# Patient Record
Sex: FEMALE | Race: WHITE | NOT HISPANIC OR LATINO | Employment: OTHER
[De-identification: names, ages, dates, MRNs, and addresses within clinical notes are randomized per-mention and may not be internally consistent; named-entity substitution may affect disease eponyms.]

---

## 2020-06-16 ENCOUNTER — RECORDS - HEALTHEAST (OUTPATIENT)
Dept: ADMINISTRATIVE | Facility: OTHER | Age: 85
End: 2020-06-16

## 2020-06-16 ENCOUNTER — AMBULATORY - HEALTHEAST (OUTPATIENT)
Dept: CARDIOLOGY | Facility: CLINIC | Age: 85
End: 2020-06-16

## 2020-06-19 ENCOUNTER — OFFICE VISIT - HEALTHEAST (OUTPATIENT)
Dept: CARDIOLOGY | Facility: CLINIC | Age: 85
End: 2020-06-19

## 2020-06-19 DIAGNOSIS — I10 BENIGN ESSENTIAL HYPERTENSION: ICD-10-CM

## 2020-06-19 DIAGNOSIS — I51.7 CARDIOMEGALY: ICD-10-CM

## 2020-06-19 DIAGNOSIS — R60.0 LOWER EXTREMITY EDEMA: ICD-10-CM

## 2020-06-19 RX ORDER — ERGOCALCIFEROL (VITAMIN D2) 10 MCG
1 TABLET ORAL DAILY
Status: SHIPPED | COMMUNITY
Start: 2020-06-19 | End: 2024-03-06

## 2020-06-19 RX ORDER — AMPICILLIN TRIHYDRATE 250 MG
500 CAPSULE ORAL DAILY
Status: SHIPPED | COMMUNITY
Start: 2020-06-19 | End: 2024-03-06

## 2020-06-19 RX ORDER — VITAMIN E 268 MG
1 CAPSULE ORAL DAILY
Status: SHIPPED | COMMUNITY
Start: 2020-06-19 | End: 2024-03-06

## 2020-06-26 ENCOUNTER — COMMUNICATION - HEALTHEAST (OUTPATIENT)
Dept: CARDIOLOGY | Facility: CLINIC | Age: 85
End: 2020-06-26

## 2020-06-29 ENCOUNTER — HOSPITAL ENCOUNTER (OUTPATIENT)
Dept: CARDIOLOGY | Facility: HOSPITAL | Age: 85
Discharge: HOME | End: 2020-06-29
Attending: INTERNAL MEDICINE

## 2020-06-29 ENCOUNTER — OFFICE VISIT - HEALTHEAST (OUTPATIENT)
Dept: CARDIOLOGY | Facility: CLINIC | Age: 85
End: 2020-06-29

## 2020-06-29 DIAGNOSIS — I10 BENIGN ESSENTIAL HYPERTENSION: ICD-10-CM

## 2020-06-29 DIAGNOSIS — I10 ESSENTIAL HYPERTENSION: ICD-10-CM

## 2020-06-29 LAB
AORTIC ROOT: 3.3 CM
AORTIC VALVE MEAN VELOCITY: 111 CM/S
AR DECEL SLOPE: 2310 MM/S2
AR PEAK VELOCITY: 463 CM/S
ASCENDING AORTA: 3.5 CM
AV DIMENSIONLESS INDEX VTI: 0.7
AV MEAN GRADIENT: 6 MMHG
AV PEAK GRADIENT: 13.4 MMHG
AV REGURGITANT PEAK GRADIENT: 85.7 MMHG
AV REGURGITATION PRESSURE HALF TIME: 587 MS
AV VALVE AREA: 1.8 CM2
CV BLOOD PRESSURE: ABNORMAL MMHG
DOP CALC AO PEAK VEL: 183 CM/S
DOP CALC AO VTI: 35.1 CM
DOP CALC LVOT AREA: 2.54 CM2
DOP CALC LVOT DIAMETER: 1.8 CM
DOP CALC LVOT STROKE VOLUME: 64.9 CM3
DOP CALC MV VTI: 26.5 CM
DOP CALCLVOT PEAK VEL VTI: 25.5 CM
EJECTION FRACTION: 67 % (ref 55–75)
FRACTIONAL SHORTENING: 36.2 % (ref 28–44)
INTERVENTRICULAR SEPTUM IN END DIASTOLE: 0.8 CM (ref 0.6–0.9)
IVS/PW RATIO: 0.9
LA AREA 1: 16.2 CM2
LA AREA 2: 13.9 CM2
LEFT ATRIUM AREA: 13.9 CM2
LEFT ATRIUM LENGTH: 4.3 CM
LEFT ATRIUM SIZE: 3.3 CM
LEFT ATRIUM TO AORTIC ROOT RATIO: 1 NO UNITS
LEFT ATRIUM VOLUME: 44.5 ML
LEFT VENTRICLE CARDIAC OUTPUT: 3.6 L/MIN
LEFT VENTRICLE DIASTOLIC VOLUME: 108 CM3 (ref 46–106)
LEFT VENTRICLE HEART RATE: 56 BPM
LEFT VENTRICLE SYSTOLIC VOLUME: 36 CM3 (ref 14–42)
LEFT VENTRICULAR INTERNAL DIMENSION IN DIASTOLE: 4.7 CM (ref 3.8–5.2)
LEFT VENTRICULAR INTERNAL DIMENSION IN SYSTOLE: 3 CM (ref 2.2–3.5)
LEFT VENTRICULAR MASS: 132.3 G
LEFT VENTRICULAR OUTFLOW TRACT MEAN GRADIENT: 4 MMHG
LEFT VENTRICULAR OUTFLOW TRACT MEAN VELOCITY: 87.4 CM/S
LEFT VENTRICULAR POSTERIOR WALL IN END DIASTOLE: 0.9 CM (ref 0.6–0.9)
MITRAL VALVE E/A RATIO: 0.7
MITRAL VALVE MEAN INFLOW VELOCITY: 32.2 CM/S
MITRAL VALVE PEAK VELOCITY: 89.5 CM/S
MV AREA VTI: 2.45 CM2
MV AVERAGE E/E' RATIO: 9.4 CM/S
MV DECELERATION TIME: 317 MS
MV E'TISSUE VEL-LAT: 6.34 CM/S
MV E'TISSUE VEL-MED: 3.51 CM/S
MV LATERAL E/E' RATIO: 7.3
MV MEAN GRADIENT: 1 MMHG
MV MEDIAL E/E' RATIO: 13.2
MV PEAK A VELOCITY: 69.1 CM/S
MV PEAK E VELOCITY: 46.4 CM/S
MV PEAK GRADIENT: 3.2 MMHG
MV VALVE AREA BY CONTINUITY EQUATION: 2.4 CM2
TRICUSPID REGURGITATION PEAK PRESSURE GRADIENT: 20.8 MMHG
TRICUSPID VALVE ANULAR PLANE SYSTOLIC EXCURSION: 1.9 CM
TRICUSPID VALVE PEAK REGURGITANT VELOCITY: 228 CM/S

## 2020-06-30 ENCOUNTER — COMMUNICATION - HEALTHEAST (OUTPATIENT)
Dept: CARDIOLOGY | Facility: CLINIC | Age: 85
End: 2020-06-30

## 2020-06-30 LAB
ATRIAL RATE - MUSE: 55 BPM
DIASTOLIC BLOOD PRESSURE - MUSE: NORMAL
INTERPRETATION ECG - MUSE: NORMAL
P AXIS - MUSE: 38 DEGREES
PR INTERVAL - MUSE: 162 MS
QRS DURATION - MUSE: 88 MS
QT - MUSE: 436 MS
QTC - MUSE: 417 MS
R AXIS - MUSE: 1 DEGREES
SYSTOLIC BLOOD PRESSURE - MUSE: NORMAL
T AXIS - MUSE: 22 DEGREES
VENTRICULAR RATE- MUSE: 55 BPM

## 2020-07-13 ENCOUNTER — COMMUNICATION - HEALTHEAST (OUTPATIENT)
Dept: CARDIOLOGY | Facility: CLINIC | Age: 85
End: 2020-07-13

## 2020-07-13 DIAGNOSIS — I10 ESSENTIAL HYPERTENSION: ICD-10-CM

## 2020-07-15 ENCOUNTER — COMMUNICATION - HEALTHEAST (OUTPATIENT)
Dept: CARDIOLOGY | Facility: CLINIC | Age: 85
End: 2020-07-15

## 2020-09-25 ENCOUNTER — COMMUNICATION - HEALTHEAST (OUTPATIENT)
Dept: CARDIOLOGY | Facility: CLINIC | Age: 85
End: 2020-09-25

## 2020-09-28 ENCOUNTER — OFFICE VISIT - HEALTHEAST (OUTPATIENT)
Dept: CARDIOLOGY | Facility: CLINIC | Age: 85
End: 2020-09-28

## 2020-09-28 DIAGNOSIS — I10 ESSENTIAL HYPERTENSION: ICD-10-CM

## 2020-09-28 ASSESSMENT — MIFFLIN-ST. JEOR: SCORE: 1074.99

## 2020-12-03 ENCOUNTER — COMMUNICATION - HEALTHEAST (OUTPATIENT)
Dept: CARDIOLOGY | Facility: CLINIC | Age: 85
End: 2020-12-03

## 2020-12-04 ENCOUNTER — OFFICE VISIT - HEALTHEAST (OUTPATIENT)
Dept: CARDIOLOGY | Facility: CLINIC | Age: 85
End: 2020-12-04

## 2020-12-04 DIAGNOSIS — I10 ESSENTIAL HYPERTENSION: ICD-10-CM

## 2020-12-04 ASSESSMENT — MIFFLIN-ST. JEOR: SCORE: 1074.99

## 2020-12-07 ENCOUNTER — COMMUNICATION - HEALTHEAST (OUTPATIENT)
Dept: CARDIOLOGY | Facility: CLINIC | Age: 85
End: 2020-12-07

## 2020-12-07 DIAGNOSIS — I10 ESSENTIAL HYPERTENSION: ICD-10-CM

## 2021-03-11 ENCOUNTER — COMMUNICATION - HEALTHEAST (OUTPATIENT)
Dept: CARDIOLOGY | Facility: CLINIC | Age: 86
End: 2021-03-11

## 2021-03-12 ENCOUNTER — RECORDS - HEALTHEAST (OUTPATIENT)
Dept: ADMINISTRATIVE | Facility: OTHER | Age: 86
End: 2021-03-12

## 2021-04-09 ENCOUNTER — AMBULATORY - HEALTHEAST (OUTPATIENT)
Dept: CARDIOLOGY | Facility: CLINIC | Age: 86
End: 2021-04-09

## 2021-04-09 DIAGNOSIS — I10 ESSENTIAL HYPERTENSION: ICD-10-CM

## 2021-04-12 ENCOUNTER — COMMUNICATION - HEALTHEAST (OUTPATIENT)
Dept: CARDIOLOGY | Facility: CLINIC | Age: 86
End: 2021-04-12

## 2021-04-13 ENCOUNTER — COMMUNICATION - HEALTHEAST (OUTPATIENT)
Dept: CARDIOLOGY | Facility: CLINIC | Age: 86
End: 2021-04-13

## 2021-04-13 DIAGNOSIS — I10 ESSENTIAL HYPERTENSION: ICD-10-CM

## 2021-04-13 RX ORDER — OLMESARTAN MEDOXOMIL 20 MG/1
20 TABLET ORAL 2 TIMES DAILY
Qty: 180 TABLET | Refills: 1 | Status: SHIPPED | OUTPATIENT
Start: 2021-04-13 | End: 2021-09-07

## 2021-05-26 VITALS — SYSTOLIC BLOOD PRESSURE: 108 MMHG | DIASTOLIC BLOOD PRESSURE: 53 MMHG

## 2021-06-04 VITALS
SYSTOLIC BLOOD PRESSURE: 110 MMHG | WEIGHT: 129.38 LBS | DIASTOLIC BLOOD PRESSURE: 60 MMHG | HEART RATE: 66 BPM | OXYGEN SATURATION: 97 % | RESPIRATION RATE: 12 BRPM

## 2021-06-04 VITALS
WEIGHT: 135 LBS | SYSTOLIC BLOOD PRESSURE: 160 MMHG | BODY MASS INDEX: 21.19 KG/M2 | HEIGHT: 67 IN | DIASTOLIC BLOOD PRESSURE: 68 MMHG | RESPIRATION RATE: 12 BRPM | HEART RATE: 68 BPM

## 2021-06-05 VITALS
SYSTOLIC BLOOD PRESSURE: 166 MMHG | HEIGHT: 67 IN | WEIGHT: 135 LBS | BODY MASS INDEX: 21.19 KG/M2 | DIASTOLIC BLOOD PRESSURE: 78 MMHG | RESPIRATION RATE: 16 BRPM | HEART RATE: 64 BPM

## 2021-06-09 NOTE — TELEPHONE ENCOUNTER
----- Message from Rafael Espinosa MD sent at 6/29/2020  9:34 PM CDT -----  Echo looks good, normal ef, no significant valve abnormality, dr marshall saw her today and was aware of the echo results  mdg

## 2021-06-09 NOTE — PATIENT INSTRUCTIONS - HE
Geneva Stearns,    It was a pleasure to see you today at the [facility].     My recommendations after this visit include:    Stop atenolol  Take HCTZ in am  And benicar in PM  Check BP sitting and standing   Call us back in 2 weeks with BP readings    ROBLES Feliciano MD, FACC, [facility]

## 2021-06-09 NOTE — TELEPHONE ENCOUNTER
----- Message from Dahlia Singh sent at 7/13/2020  8:28 AM CDT -----  General phone call:    Caller: Daughter Naye Stearns  Primary cardiologist: Dr Feliciano  Detailed reason for call: Calling in with blood pressure readings for the last 2 weeks  New or active symptoms?   Best phone number: Naye [phone]  Best time to contact: Anytime  Ok to leave a detailed message? Yes  Device? no    Additional Info:      Returned call to pt's daughter, Naye who shared the following HR and BP recordings.  5-Jul 9:30  130/67 67   3:00 PM 86/84 - dizziness    930 /65    6-Jul 128/67 75    112/59    10:00 AM 97/56 73   6:00 /69 75   11-Jul 125/70    12:00 93/56 - dizziness 74    130/65 - Standing    7:00 /71    12-Jul 140/78 - Standing 94     Pt admits to occasionally feelihg lightheadedness/weakness after taking her hydrochlorothiazide pill which she takes around noon daily. Pt is worried about her BP dropping at night so she takes her olmesartan in the evening instead.     Dr. Feliciano, pt's daughter call in with the BP and HR recordings above. Pt complains of dizziness/weakness when BP drops below 100 which happens occasionally after taking hydrochlorothiazide around noon. Any new recs?

## 2021-06-09 NOTE — TELEPHONE ENCOUNTER
Spoke with daughter. Dr. SEQUEIRA went over things and they are happy with him. Will continue with   .

## 2021-06-09 NOTE — TELEPHONE ENCOUNTER
Called and shared recs with pt's daughter, Naye. Agreeable to plan. Will send msg to scheduling to update 3 month recall with [facility].

## 2021-06-09 NOTE — PROGRESS NOTES
Review of Systems - ALL WNL    MEDICATION QUESTIONS/TIMING    VIDEO VISIT    Pooja Dominguez, CMA

## 2021-06-09 NOTE — TELEPHONE ENCOUNTER
----- Message from Kayleen Alison sent at 7/15/2020 12:14 PM CDT -----  General phone call:  DAUGHTER CALLING ABOUT HER BLOOD PRESSURE AND MEDICATION  READINGS ARE LOW AND PATIENT IS AFRAID TO TAKE THE NEXT PILL, PLEASE CALL  Caller: NAYE FLYNN  Primary cardiologist: DR MOSS  Detailed reason for call: SEE ABOVE  New or active symptoms? YES, SEE ABOVE  Best phone number: [phone]  Best time to contact: ANY TIME  Ok to leave a detailed message? YES  Device? NO    Additional Info:           Called back Naye to address her concerns. See telephone encounter from 7/13/2020. She states her mom is still anxious regarding her blood pressure and taking her HCTZ. Last note with [facility] instructed her to take in the AM and then most recently, M, W, F. Her daughter states that at 12 pm today, her mother's BP was 114 systolic and rechecked at 103 and she was nervous to take her HCTZ. Reiterated that it was noon, but her mother does not wake up until after 10 am. She is going to go run errands and is nervous she will feel poorly if her blood pressure drops too low. Reassured Naye and stated that if her mother does not want to take her hctz before her errands, she does not have it and this is her choice. Encouraged her to check her blood pressure this afternoon and continue to take her HCTZ on the Mon- Wed-Fri schedule and report back blood pressures so we can have accurate data on this medication. Naye verbalized understanding and was tearful as she is bipolar and it is difficult for her to manage her mother's care. -[facility]

## 2021-06-09 NOTE — PATIENT INSTRUCTIONS - HE
It was nice to visit with you today.  I thought that it would be best if we have you to come to the office which we are going to schedule next week along with an ultrasound of your heart.  We talked about some dizziness that you are experiencing and I suggested that you change the atenolol from 1/2 tablet twice daily which you told me you were taking to 1/2 tablet a day and to continue on the other medications.  If you are experiencing more significant dizziness or shortness of breath or leg swelling please do not hesitate to contact us or seek more immediate attention if symptoms are progressive.  My nurse is Rere and her number is [phone]

## 2021-06-09 NOTE — TELEPHONE ENCOUNTER
Wellness Screening Tool  Symptom Screening:  Do you have one of the following NEW symptoms:    Fever (subjective or >100.0)?  No    A new cough?  No    Shortness of breath?  No     Chills? No     New loss of taste or smell? No     Generalized body aches? No     New persistent headache? No     New sore throat? No     Nausea, vomiting, or diarrhea?  No    Within the past 3 weeks, have you been exposed to someone with a known positive illness below:    COVID-19 (known or suspected)?  No    Chicken pox?  No    Mealses?  No    Pertussis?  No    Patient notified of visitor policy- They may have one person accompany them to their appointment, but they will need to wear a mask and will be screened upon arrival for symptoms: Yes  Pt informed to wear a mask: Yes  Pt notified if they develop any symptoms listed above, prior to their appointment, they are to call the clinic directly at [phone] for further instructions.  Yes  Patient's appointment status: Patient will be seen in clinic as scheduled on Monday with ALLYSSA

## 2021-06-11 NOTE — PATIENT INSTRUCTIONS - HE
Geneva Stearns,    It was a pleasure to see you today at the [facility].     My recommendations after this visit include:    Khushbu Feliciano MD, FACC, [facility]

## 2021-06-11 NOTE — TELEPHONE ENCOUNTER
Wellness Screening Tool  Symptom Screening:  Do you have one of the following NEW symptoms:    Fever (subjective or >100.0)?  No    A new cough?  No    Shortness of breath?  No     Chills? No     New loss of taste or smell? No     Generalized body aches? No     New persistent headache? No     New sore throat? No     Nausea, vomiting, or diarrhea?  No    Within the past 2 weeks, have you been exposed to someone with a known positive illness below:    COVID-19 (known or suspected)?  No    Chicken pox?  No    Mealses?  No    Pertussis?  No    Patient notified of visitor policy- They may have one person accompany them to their appointment, but they will need to wear a mask and will be screened upon arrival for symptoms: Yes  Pt informed to wear a mask: Yes  Pt notified if they develop any symptoms listed above, prior to their appointment, they are to call the clinic directly at [phone] for further instructions.  Yes  Patient's appointment status: Patient will be seen in clinic as scheduled on 9/28

## 2021-06-13 NOTE — PATIENT INSTRUCTIONS - HE
Geneva Stearns,    It was a pleasure to see you today at the [facility].     My recommendations after this visit include:    Continue current blood pressure medication    ROBLES Feliciano MD, FACC, [facility]

## 2021-06-13 NOTE — TELEPHONE ENCOUNTER
Wellness Screening Tool  Symptom Screening:  Do you have one of the following NEW symptoms:    Fever (subjective or >100.0)?  No    A new cough?  No    Shortness of breath?  No     Chills? No     New loss of taste or smell? No     Generalized body aches? No     New persistent headache? No     New sore throat? No     Nausea, vomiting, or diarrhea?  No    Within the past 2 weeks, have you been exposed to someone with a known positive illness below:    COVID-19 (known or suspected)?  No    Chicken pox?  No    Mealses?  No    Pertussis?  No    Patient notified of visitor policy- No visitors are allowed to accompany the patient at this time. Yes  Pt informed to wear a mask: Yes  Pt notified if they develop any symptoms listed above, prior to their appointment, they are to call the clinic directly at [phone] for further instructions.  Yes  Patient's appointment status: Patient will be seen in clinic as scheduled on 10/4

## 2021-06-15 NOTE — TELEPHONE ENCOUNTER
Received fax from patient's insurance company stating that Olmesartan is not covered in patient's plan due to a Quantity Limit- she is on 20 mg two times a day. She was previously splitting a 40 mg tablet in half. Called patient and the number is for her daughter, Naye. She states that Geneva mentioned something about a quantity limit but they picked up the Rx last week and did not have any difficulties. Will call [facility] to determine further. Fax sent to [facility] to scan to chart. -[facility]           Called [facility] to determine what the fax meant and if they knew anything about Geneva's Olmesartan. The pharmacist states that her insurance will only cover dispensing 30 day supply at a time so she just cannot fill a full 90 day supply. Called Naye and updated on this information. She verbalized understanding and will call back if they have any other questions or concerns. -[facility]

## 2021-06-16 PROBLEM — I10 ESSENTIAL HYPERTENSION: Status: ACTIVE | Noted: 2020-06-29

## 2021-06-16 NOTE — TELEPHONE ENCOUNTER
Prior Authorization Request  Who s requesting:  Pharmacy  Pharm[facility] Name and Location: [facility]  Medication Name: Olmesartan  Insurance Plan: [phone]  Insurance Member ID Number:  [id number]  CoverMyMeds Key: N/A  Informed patient that prior authorizations can take up to 10 business days for response:   No  Okay to leave a detailed message: Yes

## 2021-06-16 NOTE — TELEPHONE ENCOUNTER
Central PA team  [phone]  Pool: HE PA MED ([id number])          PA has been initiated.       PA form completed and faxed insurance via Cover My Meds     Key:  [id number]     Medication:  OLMESARTAN 20MG    Insurance:  [employer]        Response will be received via fax and may take up to 5-10 business days depending on plan

## 2021-06-17 NOTE — TELEPHONE ENCOUNTER
Telephone Encounter by Tamela Walsh at 4/12/2021  4:51 PM     Author: Tamela Walsh Service: -- Author Type: --    Filed: 4/12/2021  4:51 PM Encounter Date: 4/12/2021 Status: Signed    : Tamela Walsh APPROVED:    Approval start date: 1/12/21  Approval end date:  4/12/22    Pharmacy has been notified of approval and will contact patient when medication is ready for pickup.

## 2021-06-29 NOTE — PROGRESS NOTES
"Progress Notes by Ron Feliciano MD (Ted) at 9/28/2020 12:50 PM     Author: Ron Feliciano MD (Ted) Service: -- Author Type: Physician    Filed: 9/28/2020  1:31 PM Encounter Date: 9/28/2020 Status: Signed    : Ron Feliciano MD (Ted) (Physician)           Cardiology Progress Note    Assessment:  Hypertension, labile, probably still too tight control  Orthostatic lightheadedness improved  Sinus bradycardia, mild, resolved after discontinuation of atenolol  Aortic regurgitation, mild to moderate, asymptomatic      Plan:  She can stop taking hydrochlorothiazide.  She was told that her systolic blood pressure should stay around 120 at no higher than 140.  Follow-up in 2 months          Subjective:   This is 87 y.o. female who comes in today for follow-up visit.  She continues to feel lightheaded.  She has not had weight gain, PND, orthopnea.  She denies chest pains or heart palpitations.  She reports that her systolic blood pressure is frequently less than 100    Review of Systems:   General: WNL  Eyes: WNL  Ears/Nose/Throat: WNL  Lungs: WNL  Heart: WNL  Stomach: WNL  Bladder: WNL  Muscle/Joints: WNL  Skin: WNL  Nervous System: Dizziness  Mental Health: WNL     Blood: WNL    Objective:   /68 (Patient Site: Left Arm, Patient Position: Sitting, Cuff Size: Adult Regular)   Pulse 68   Resp 12   Ht 5' 7\" (1.702 m)   Wt 135 lb (61.2 kg)   BMI 21.14 kg/m    Physical Exam:  GENERAL: no distress  NECK: No JVD  LUNGS: Clear to auscultation.  CARDIAC: regular rhythm, S1 & S2 normal.  No heaves, thrills, gallops 2/6 systolic ejection murmur at the aortic area  ABDOMEN: flat, negative hepatosplenomegaly, soft and non-tender.  EXTREMITIES: No evidence of cyanosis, clubbing or edema.    Current Outpatient Medications   Medication Sig Dispense Refill   ? CHONDROITIN SULFATE A ORAL Take 1 tablet by mouth daily.     ? cinnamon bark (CINNAMON) 500 mg capsule Take 500 mg by mouth " daily.     ? ergocalciferol, vitamin D2, 10 mcg (400 unit) Tab Take 1 tablet by mouth daily.     ? flaxseed oil 1,000 mg cap Take 1 tablet by mouth daily.     ? olmesartan (BENICAR) 40 MG tablet Take 20 mg by mouth daily.      ? vitamin E 400 UNIT capsule Take 1 capsule by mouth daily.       No current facility-administered medications for this visit.        Cardiographics:    ECG: Sinus bradycardia with sinus arrhythmia  Echocardiogram:     1.Left ventricle ejection fraction is normal. The calculated left ventricular ejection fraction is 67%.    2.TAPSE is normal, which is consistent with normal right ventricular systolic function.    3.Minimal bowing/prolapse of posterior mitral valve leaflet without any significant mitral regurgitation.    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    5.No previous study for comparison.    Normal CVP      Ron Feliciano MD (Ted)

## 2021-06-29 NOTE — PROGRESS NOTES
Progress Notes by Ron Feliciano MD (Ted) at 6/29/2020  4:10 PM     Author: Ron Feliciano MD (Ted) Service: -- Author Type: Physician    Filed: 6/29/2020  4:50 PM Encounter Date: 6/29/2020 Status: Signed    : Ron Feliciano MD (Ted) (Physician)           Cardiology Progress Note    Assessment:  Hypertension, probably too tight control  Orthostatic lightheadedness improved  Sinus bradycardia, mild  Aortic regurgitation, mild to moderate, asymptomatic    Plan:  I think we can discontinue atenolol because of mild bradycardia and orthostatic symptoms.  I asked her to take hydrochlorothiazide in the morning and Benicar at night  She will require blood pressure cuff.  She should be checking both sitting and standing blood pressure.  She was instructed to call us in 2 weeks with new readings.  I think we should be aiming for sitting systolic blood pressure around 140 mmHg    Subjective:   This is 87 y.o. female who comes in today for follow-up visit.  Earlier this month she had a virtual visit with Dr. Espinosa who recommended return to the clinic for cardiac physical exam.  In the beginning of May she developed swelling of lower extremities.  BNP was mildly elevated.  She was started on diuretics by her primary physician.  She developed orthostatic lightheadedness but did not lose consciousness or fall down.  She denies heart palpitations.  She has not had exertional chest pain.  She has no history of heart disease.  She has longstanding history of hypertension.  Lisinopril/ hydrochlorothiazide was recently discontinued and she was placed on Benicar    Review of Systems:   General: WNL  Eyes: WNL  Ears/Nose/Throat: WNL  Lungs: WNL  Heart: Irregular Heartbeat, Leg Swelling  Stomach: WNL  Bladder: Frequent Urination at Night  Muscle/Joints: WNL  Skin: WNL  Nervous System: Dizziness  Mental Health: WNL     Blood: WNL    Objective:   /60 (Patient Site: Right Arm, Patient  Position: Sitting, Cuff Size: Adult Small)   Pulse 66   Resp 12   Wt 129 lb 6 oz (58.7 kg)   SpO2 97%   Breastfeeding No   Physical Exam:  GENERAL: no distress  NECK: No JVD  LUNGS: Clear to auscultation.  CARDIAC: regular rhythm with ectopies, S1 & S2 normal.  No heaves, thrills, gallops 2/6 systolic ejection murmur at the aortic area  ABDOMEN: flat, negative hepatosplenomegaly, soft and non-tender.  EXTREMITIES: No evidence of cyanosis, clubbing or edema.    Current Outpatient Medications   Medication Sig Dispense Refill   ? atenoloL (TENORMIN) 25 MG tablet Take 12.5 mg by mouth daily.      ? CHONDROITIN SULFATE A ORAL Take 1 tablet by mouth daily.     ? cinnamon bark (CINNAMON) 500 mg capsule Take 500 mg by mouth daily.     ? ergocalciferol, vitamin D2, 10 mcg (400 unit) Tab Take 1 tablet by mouth daily.     ? flaxseed oil 1,000 mg cap Take 1 tablet by mouth daily.     ? hydroCHLOROthiazide (MICROZIDE) 12.5 mg capsule Take 12.5 mg by mouth daily.     ? olmesartan (BENICAR) 40 MG tablet Take 20 mg by mouth daily.      ? vitamin E 400 UNIT capsule Take 1 capsule by mouth daily.       No current facility-administered medications for this visit.        Cardiographics:    ECG: Sinus bradycardia with sinus arrhythmia  Echocardiogram:     1.Left ventricle ejection fraction is normal. The calculated left ventricular ejection fraction is 67%.    2.TAPSE is normal, which is consistent with normal right ventricular systolic function.    3.Minimal bowing/prolapse of posterior mitral valve leaflet without any significant mitral regurgitation.    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    5.No previous study for comparison.    Normal CVP    Lab Results:       No results found for: CHOL  No results found for: HDL  No results found for: LDLCALC  No results found for: TRIG  No results found for: BNP    Ron Feliciano MD (Ted)

## 2021-06-29 NOTE — PROGRESS NOTES
"Progress Notes by Rafael Espinosa MD at 6/19/2020  9:30 AM     Author: Rafael Espinosa MD Service: -- Author Type: Physician    Filed: 6/19/2020  1:05 PM Encounter Date: 6/19/2020 Status: Signed    : Rafael Espinosa MD (Physician)           The patient has been notified of following:     \"This telephone visit will be conducted via a call between you and your physician/provider. We have found that certain health care needs can be provided without the need for a physical exam.  This service lets us provide the care you need with a phone conversation.  If a prescription is necessary we can send it directly to your pharmacy.  If lab work is needed we can place an order for that and you can then stop by our lab to have the test done at a later time. If during the course of the call the physician/provider feels a telephone visit is not appropriate, you will not be charged for this service.\" Verbal consent has been obtained for this service by care team member:         HEART CARE PHONE ENCOUNTER        The patient has chosen to have the visit conducted as a telephone visit, to reduce risk of exposure given the current status of Coronavirus in our community. This telephone visit is being conducted via a call between the patient and physician/provider. Health care needs are being provided without a physical exam.     Assessment/Recommendations   Assessment:    1.  Patient is visited by phone as video conferencing could not be completed by the patient and her daughter.  Somewhat limited information is obtained from the primary care provider's office.  Patient had increasing lower extremity edema in May 2020.  She denies chest pain, orthopnea or PND.  She reports that the swelling has resolved with furosemide and now with hydrochlorothiazide.  Work-up included CT scan with the results described which included mild coronary calcification and reported cardiomegaly.  I have suggested that due to the inability to examine " the patient I have recommended that we have her seen in the office in the near future and that we plan a cardiac echocardiogram.  I have made arrangements for this next week.  Following issues are addressed.    1.  Hypertension.  She reports blood pressures that are quite variable but tells me that her blood pressure cuff is old. She  is going to bring in the blood pressure cuff when she comes in for the office visit.  She has been following a more prudent low-salt diet which she states she was somewhat lax in doing before the onset of the lower extremity edema.  She does report feeling dizzy or mildly lightheaded without syncope or near syncope since starting the current combination of medications.  She has already been splitting the medications in half and taking a half a tablet twice a day of the atenolol and Benicar.  She tells me that heart rates are in the 50s and 60s and I recommended that she change the atenolol to 12.5 mg once daily.  She is asked to change positions slowly and to update us or seek more immediate attention worsening symptoms of dizziness or lightheadedness.    2.  Lower extremity edema.  Reportedly improved.  Patient reported BNP was elevated at 356.  In addition CT scan suggests cardiomegaly and mild coronary calcification.  I have suggested that we obtain an echocardiogram to further evaluate left ventricular function which she will do on the same day that she comes for the office appointment.    3.  Dyslipidemia.  Most recent lipids available in the outpatient chart record is from November 2019 where the cholesterol was 207 and .  Plan:  1.  We will make arrangements for office visit in the near future to further evaluate given limitations with the phone visit today.  Patient expresses some frustration with recent recommendations and therefore I think would benefit from formal office consultation.  They are in agreement.  2.  She is going to cut the atenolol 12.5 mg once daily  instead of twice daily and monitor for increasing symptoms of dizziness or lightheadedness.  3.  We will arrange echocardiogram to be done at the same time as the upcoming visit.  4.  Given recent initiation of Benicar, hydrochlorothiazide and utilization of furosemide would recommend chemistries which can be done when she comes for the office visit.  5.  Will defer follow-up of the lung nodule and thyroid nodule that was reported on the CT scan to her primary care provider.  6.  No EKG has been provided for review.  Would recommend EKG when she comes in for the office visit as well.      Follow Up Plan: Follow up in   I have reviewed the note as documented.  This accurately captures the substance of my conversation with the patient.    Total time of call between patient and provider was 30 minutes   Start Time:928  Stop Time:958       History of Present Illness/Subjective    Geneva Stearns is a 87 y.o. female who is being evaluated via a billable telephone visit.    She is accompanied by her daughter.  We tried the video conference for this visit but they could not keep the video from shutting off and therefore we ultimately decided to talk by telephone.  I have incomplete data from the primary care provider.  We are trying to get some additional information.  Patient states that at the end of May she had developed some swelling of her lower extremity but denies chest pain or shortness of breath to my discussion.  She was reportedly given a 7-day course of furosemide with improvement in the lower extremity edema and then started on hydrochlorothiazide.  Reportedly she previously had been on lisinopril and hydrochlorothiazide which had been discontinued sometime in the past although details are not certain.  She was seen by the primary care provider and she was placed on Benicar, hydrochlorothiazide and atenolol by report.  She reports receiving a 7-day course of furosemide.  She does tell me that she feels a  little dizzy at times.  She is not certain that her blood pressure cuff is accurate as her blood pressures have been quite variable.  She states that heart rates are typically in the 50s and 60s.  On her own she split the atenolol and the Benicar and takes a half a tablet twice a day.  She states that she has been healthy and has had no cardiovascular history other than hypertension.  She tells me that she did undergo Dopplers of the lower extremities and CT of her chest recently.  They were able to read me the results of the CT scan that apparently was performed at [facility] radiology and I have requested records.  They indicate that the report showed no pulmonary embolism but positive for a lung nodule with recommended follow-up in 6 to 12 months.  They report to me that the Dopplers of the lower extremities were negative for blood clot but showed cysts in the back of her legs.    She was seen in the emergency room of an outside hospital 2019 for cough and weakness and a reported outside x-ray that showed a right middle lobe infiltrate.  She was started on antibiotics after being seen in an urgent care center.  During that visit troponin was reported to be negative,  which was reported within normal limits and that laboratory range.  X-ray reportedly showed minimal stranding but no nabil consolidation and mildly hyper aerated lungs.    Cardiac risk factors: Negative for tobacco, occasional alcohol, positive for hypertension, positive for apparent hyperlipidemia, positive for apparent type 2 diabetes that is reported controlled by her primary care provider.  Family history patient reports that mother  at age 75 and father at age 74 of heart related issues but did not know details.    Past medical history, obtained from the primary care physician's notes.  Hyperlipidemia, details not known  Plantar fasciitis  Hypertension  Hematuria  Diabetes mellitus reportedly diet controlled.  Lab work from  December 2019 at the ER demonstrated normal potassium BUN and creatinine, normal troponin, white count 10.5, hemoglobin 12.1, platelets 368.  Glucose was 136.  I have reviewed and updated the patient's Past Medical History, Social History,     Family History and Medication List.  Mother with reported unspecified heart disease in her 70s.  Father with reported unspecified heart disease in his is.  No ECG is available for review.  Denies knowledge of prior echocardiogram or other heart related tests.     Doppler lower extremity June 2, 2020 no reported DVT, bilateral Baker cysts.    CT of the chest for chest pain although patient denies to me a history of chest pain.  Findings indicated no evidence for emboli or chronic pulmonary embolic disease, prominent biapical subpleural nodular scarring, 8 mm solid noncalcified pulmonary nodule, mediastinum indicated cardiomegaly, no pericardial effusion, mild coronary calcified atherosclerosis, remote caliber of the thoracic aorta.  Positive thyroid nodule.     Physical Examination not performed given phone encounter Review of Systems                                                Medical History  Surgical History Family History Social History   No past medical history on file. No past surgical history on file. No family history on file. Social History     Socioeconomic History   ? Marital status:      Spouse name: Not on file   ? Number of children: Not on file   ? Years of education: Not on file   ? Highest education level: Not on file   Occupational History   ? Not on file   Social Needs   ? Financial resource strain: Not on file   ? Food insecurity     Worry: Not on file     Inability: Not on file   ? Transportation needs     Medical: Not on file     Non-medical: Not on file   Tobacco Use   ? Smoking status: Never Smoker   ? Smokeless tobacco: Never Used   Substance and Sexual Activity   ? Alcohol use: Not Currently   ? Drug use: Never   ? Sexual activity: Not on  file   Lifestyle   ? Physical activity     Days per week: Not on file     Minutes per session: Not on file   ? Stress: Not on file   Relationships   ? Social connections     Talks on phone: Not on file     Gets together: Not on file     Attends Jain service: Not on file     Active member of club or organization: Not on file     Attends meetings of clubs or organizations: Not on file     Relationship status: Not on file   ? Intimate partner violence     Fear of current or ex partner: Not on file     Emotionally abused: Not on file     Physically abused: Not on file     Forced sexual activity: Not on file   Other Topics Concern   ? Not on file   Social History Narrative   ? Not on file          Medications  Allergies   Current Outpatient Medications   Medication Sig Dispense Refill   ? atenoloL (TENORMIN) 25 MG tablet Take 25 mg by mouth daily.     ? CHONDROITIN SULFATE A ORAL Take 1 tablet by mouth daily.     ? cinnamon bark (CINNAMON) 500 mg capsule Take 500 mg by mouth daily.     ? ergocalciferol, vitamin D2, 10 mcg (400 unit) Tab Take 1 tablet by mouth daily.     ? flaxseed oil 1,000 mg cap Take 1 tablet by mouth daily.     ? hydroCHLOROthiazide (MICROZIDE) 12.5 mg capsule Take 12.5 mg by mouth daily.     ? olmesartan (BENICAR) 40 MG tablet Take 40 mg by mouth daily.     ? vitamin E 400 UNIT capsule Take 1 capsule by mouth daily.       No current facility-administered medications for this visit.     No Known Allergies      Lab Results    Chemistry/lipid CBC Cardiac Enzymes/BNP/TSH/INR   No results found for: CHOL, HDL, LDLCALC, TRIG, CREATININE, BUN, K, NA, CL, CO2 No results found for: WBC, HGB, HCT, MCV, PLT No results found for: CKTOTAL, CKMB, CKMBINDEX, TROPONINI, BNP, TSH, INR     Rafael Espinosa

## 2021-06-30 NOTE — PROGRESS NOTES
"Progress Notes by Ron Feliciano MD (Ted) at 12/4/2020 10:30 AM     Author: Ron Feliciano MD (Ted) Service: -- Author Type: Physician    Filed: 12/4/2020 10:58 AM Encounter Date: 12/4/2020 Status: Signed    : Ron Feliciano MD (Ted) (Physician)           Cardiology Progress Note    Assessment:  Hypertension,  good control  Orthostatic lightheadedness, resolved  Sinus bradycardia, mild, resolved after discontinuation of atenolol  Aortic regurgitation, mild to moderate, asymptomatic      Plan:  Continue current cardiac medications    Cardiac exam does not reveal worsening of aortic regurgitation.      Routine follow-up in 1 year    Subjective:   This is 88 y.o. female who comes in today for follow-up visit.  She has done well.  She denies chest pain or shortness of breath.  She lives independently in her own house.  She does most of the chores at her home by herself.  Her systolic blood pressure stays between 110 and 140.  She has not had heart palpitations or syncope    Review of Systems:   General: WNL  Eyes: WNL  Ears/Nose/Throat: WNL  Lungs: WNL  Heart: WNL  Stomach: WNL  Bladder: WNL  Muscle/Joints: WNL  Skin: WNL  Nervous System: WNL  Mental Health: WNL     Blood: WNL    Objective:   /78 (Patient Site: Left Arm, Patient Position: Sitting, Cuff Size: Adult Regular)   Pulse 64   Resp 16   Ht 5' 7\" (1.702 m)   Wt 135 lb (61.2 kg)   BMI 21.14 kg/m    Physical Exam:  GENERAL: no distress  NECK: No JVD  LUNGS: Clear to auscultation.  CARDIAC: regular rhythm, S1 & S2 normal.  No heaves, thrills, gallops soft ejection murmur at the aortic area  ABDOMEN: flat, negative hepatosplenomegaly, soft and non-tender.  EXTREMITIES: No evidence of cyanosis, clubbing or edema.    Current Outpatient Medications   Medication Sig Dispense Refill   ? CHONDROITIN SULFATE A ORAL Take 1 tablet by mouth daily.     ? cinnamon bark (CINNAMON) 500 mg capsule Take 500 mg by mouth daily.  "    ? ergocalciferol, vitamin D2, 10 mcg (400 unit) Tab Take 1 tablet by mouth daily.     ? flaxseed oil 1,000 mg cap Take 1 tablet by mouth daily.     ? olmesartan (BENICAR) 40 MG tablet Take 0.5 tablets (20 mg total) by mouth daily. 30 tablet 12   ? vitamin E 400 UNIT capsule Take 1 capsule by mouth daily.       No current facility-administered medications for this visit.        Cardiographics:    ECG: June 2020 sinus bradycardia with sinus arrhythmia  Echocardiogram: June 2020    1.Left ventricle ejection fraction is normal. The calculated left ventricular ejection fraction is 67%.    2.TAPSE is normal, which is consistent with normal right ventricular systolic function.    3.Minimal bowing/prolapse of posterior mitral valve leaflet without any significant mitral regurgitation.    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    5.No previous study for comparison.    Normal CVP  Ron (Francesco Feliciano MD

## 2021-09-07 DIAGNOSIS — I10 ESSENTIAL HYPERTENSION: ICD-10-CM

## 2021-09-07 RX ORDER — OLMESARTAN MEDOXOMIL 20 MG/1
20 TABLET ORAL 2 TIMES DAILY
Qty: 180 TABLET | Refills: 0 | Status: SHIPPED | OUTPATIENT
Start: 2021-09-07 | End: 2021-12-07

## 2021-12-06 ENCOUNTER — OFFICE VISIT (OUTPATIENT)
Dept: CARDIOLOGY | Facility: CLINIC | Age: 86
End: 2021-12-06
Payer: MEDICARE

## 2021-12-06 VITALS
WEIGHT: 146 LBS | DIASTOLIC BLOOD PRESSURE: 68 MMHG | SYSTOLIC BLOOD PRESSURE: 160 MMHG | BODY MASS INDEX: 22.87 KG/M2 | RESPIRATION RATE: 14 BRPM | HEART RATE: 70 BPM

## 2021-12-06 DIAGNOSIS — I10 ESSENTIAL HYPERTENSION: Primary | ICD-10-CM

## 2021-12-06 PROCEDURE — 99214 OFFICE O/P EST MOD 30 MIN: CPT | Performed by: INTERNAL MEDICINE

## 2021-12-06 RX ORDER — AMLODIPINE BESYLATE 2.5 MG/1
2.5 TABLET ORAL DAILY
Qty: 30 TABLET | Refills: 11 | Status: SHIPPED | OUTPATIENT
Start: 2021-12-06 | End: 2021-12-07

## 2021-12-06 NOTE — LETTER
12/6/2021    Yee Nolasco MD  [facility] [address]  [place] [address]    RE: Geneva ABA Daryn       Dear Colleague,     I had the pleasure of seeing Geneva ABA Stearns in the [facility].      Cardiology Progress Note     Assessment:  Hypertension,   suboptimal control  Orthostatic lightheadedness, resolved  Sinus bradycardia, mild, resolved after discontinuation of atenolol  Aortic regurgitation, mild to moderate, asymptomatic, unchanged exam today      Plan:  Start amlodipine 2.5 mg a day to improve blood pressure control with a goal of systolic blood pressure under 140  She was instructed to contact me if her blood pressure is higher than 140    I do not think we need to repeat the echo for aortic regurgitation unless she is worsening heart murmur or development of shortness of breath.    Routine follow-up in 1 year  Subjective:   This is 89 year old female who comes in today for follow-up visit.  She reports no new symptoms.  She continues to be physically active.  She was able to rake leaves  this fall.  She denies weight gain, PND, orthopnea.  She has not had heart palpitations or syncope.    Review of Systems:   Negative other than history of present illness    Objective:   BP (!) 160/68 (BP Location: Left arm, Patient Position: Sitting, Cuff Size: Adult Large)   Pulse 70   Resp 14   Wt 66.2 kg (146 lb)   BMI 22.87 kg/m    Physical Exam:  GENERAL: no distress  NECK: No JVD  LUNGS: Clear to auscultation.  CARDIAC: regular rhythm, S1 & S2 normal.  No heaves, thrills, gallops soft ejection murmur and short diastolic murmur at the aortic area  ABDOMEN: flat, negative hepatosplenomegaly, soft and non-tender.  EXTREMITIES: No evidence of cyanosis, clubbing or edema.    Current Outpatient Medications   Medication Sig Dispense Refill     CHONDROITIN SULFATE A ORAL [CHONDROITIN SULFATE A ORAL] Take 1 tablet by mouth daily.       cinnamon bark (CINNAMON) 500 mg capsule  [CINNAMON BARK (CINNAMON) 500 MG CAPSULE] Take 500 mg by mouth daily.       ergocalciferol, vitamin D2, 10 mcg (400 unit) Tab [ERGOCALCIFEROL, VITAMIN D2, 10 MCG (400 UNIT) TAB] Take 1 tablet by mouth daily.       flaxseed oil 1,000 mg cap [FLAXSEED OIL 1,000 MG CAP] Take 1 tablet by mouth daily.       olmesartan (BENICAR) 20 MG tablet Take 1 tablet (20 mg) by mouth 2 times daily 180 tablet 0     vitamin E 400 UNIT capsule [VITAMIN E 400 UNIT CAPSULE] Take 1 capsule by mouth daily.         Cardiographics:    ECG: June 2020 sinus bradycardia with sinus arrhythmia  Echocardiogram: June 2020    1.Left ventricle ejection fraction is normal. The calculated left ventricular ejection fraction is 67%.    2.TAPSE is normal, which is consistent with normal right ventricular systolic function.    3.Minimal bowing/prolapse of posterior mitral valve leaflet without any significant mitral regurgitation.    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    5.No previous study for comparison.    Normal CVP       Lab Results    Chemistry/lipid CBC Cardiac Enzymes/BNP/TSH/INR   No results for input(s): CHOL, HDL, LDL, TRIG, CHOLHDLRATIO in the last 84075 hours.  No results for input(s): LDL in the last 33443 hours.  No results for input(s): NA, POTASSIUM, CHLORIDE, CO2, GLC, BUN, CR, GFRESTIMATED, TREY in the last 08748 hours.    Invalid input(s): GRFESTBLACK  No results for input(s): CR in the last 31240 hours.  No results for input(s): A1C in the last 22543 hours.       No results for input(s): WBC, HGB, HCT, MCV, PLT in the last 49768 hours.  No results for input(s): HGB in the last 57854 hours. No results for input(s): TROPONINI in the last 26034 hours.  No results for input(s): BNP, NTBNPI, NTBNP in the last 98998 hours.  No results for input(s): TSH in the last 69077 hours.  No results for input(s): INR in the last 91805 hours.                   Andrae  MD Jodie   [facility]

## 2021-12-06 NOTE — PROGRESS NOTES
Cardiology Progress Note     Assessment:  Hypertension,   suboptimal control  Orthostatic lightheadedness, resolved  Sinus bradycardia, mild, resolved after discontinuation of atenolol  Aortic regurgitation, mild to moderate, asymptomatic, unchanged exam today      Plan:  Start amlodipine 2.5 mg a day to improve blood pressure control with a goal of systolic blood pressure under 140  She was instructed to contact me if her blood pressure is higher than 140    I do not think we need to repeat the echo for aortic regurgitation unless she is worsening heart murmur or development of shortness of breath.    Routine follow-up in 1 year  Subjective:   This is 89 year old female who comes in today for follow-up visit.  She reports no new symptoms.  She continues to be physically active.  She was able to rake leaves  this fall.  She denies weight gain, PND, orthopnea.  She has not had heart palpitations or syncope.    Review of Systems:   Negative other than history of present illness    Objective:   BP (!) 160/68 (BP Location: Left arm, Patient Position: Sitting, Cuff Size: Adult Large)   Pulse 70   Resp 14   Wt 66.2 kg (146 lb)   BMI 22.87 kg/m    Physical Exam:  GENERAL: no distress  NECK: No JVD  LUNGS: Clear to auscultation.  CARDIAC: regular rhythm, S1 & S2 normal.  No heaves, thrills, gallops soft ejection murmur and short diastolic murmur at the aortic area  ABDOMEN: flat, negative hepatosplenomegaly, soft and non-tender.  EXTREMITIES: No evidence of cyanosis, clubbing or edema.    Current Outpatient Medications   Medication Sig Dispense Refill     CHONDROITIN SULFATE A ORAL [CHONDROITIN SULFATE A ORAL] Take 1 tablet by mouth daily.       cinnamon bark (CINNAMON) 500 mg capsule [CINNAMON BARK (CINNAMON) 500 MG CAPSULE] Take 500 mg by mouth daily.       ergocalciferol, vitamin D2, 10 mcg (400 unit) Tab [ERGOCALCIFEROL, VITAMIN D2, 10 MCG (400 UNIT) TAB] Take 1 tablet by mouth daily.       flaxseed oil 1,000 mg  cap [FLAXSEED OIL 1,000 MG CAP] Take 1 tablet by mouth daily.       olmesartan (BENICAR) 20 MG tablet Take 1 tablet (20 mg) by mouth 2 times daily 180 tablet 0     vitamin E 400 UNIT capsule [VITAMIN E 400 UNIT CAPSULE] Take 1 capsule by mouth daily.         Cardiographics:    ECG: June 2020 sinus bradycardia with sinus arrhythmia  Echocardiogram: June 2020    1.Left ventricle ejection fraction is normal. The calculated left ventricular ejection fraction is 67%.    2.TAPSE is normal, which is consistent with normal right ventricular systolic function.    3.Minimal bowing/prolapse of posterior mitral valve leaflet without any significant mitral regurgitation.    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    5.No previous study for comparison.    Normal CVP       Lab Results    Chemistry/lipid CBC Cardiac Enzymes/BNP/TSH/INR   No results for input(s): CHOL, HDL, LDL, TRIG, CHOLHDLRATIO in the last 79657 hours.  No results for input(s): LDL in the last 55194 hours.  No results for input(s): NA, POTASSIUM, CHLORIDE, CO2, GLC, BUN, CR, GFRESTIMATED, TREY in the last 73184 hours.    Invalid input(s): GRFESTBLACK  No results for input(s): CR in the last 15732 hours.  No results for input(s): A1C in the last 34362 hours.       No results for input(s): WBC, HGB, HCT, MCV, PLT in the last 58742 hours.  No results for input(s): HGB in the last 52239 hours. No results for input(s): TROPONINI in the last 51249 hours.  No results for input(s): BNP, NTBNPI, NTBNP in the last 39096 hours.  No results for input(s): TSH in the last 48377 hours.  No results for input(s): INR in the last 18092 hours.

## 2021-12-06 NOTE — PATIENT INSTRUCTIONS
Geneva Stearns,    It was a pleasure to see you today at the [facility].     My recommendations after this visit include:    Start amlodipine for high BP  Call if staying higher then 140    W. Andrae Feliciano MD, FACC, [facility]

## 2021-12-07 DIAGNOSIS — I10 ESSENTIAL HYPERTENSION: Primary | ICD-10-CM

## 2021-12-07 DIAGNOSIS — I10 ESSENTIAL HYPERTENSION: ICD-10-CM

## 2021-12-07 RX ORDER — OLMESARTAN MEDOXOMIL 20 MG/1
20 TABLET ORAL 2 TIMES DAILY
Qty: 180 TABLET | Refills: 1 | Status: SHIPPED | OUTPATIENT
Start: 2021-12-07 | End: 2022-04-19

## 2022-02-24 ENCOUNTER — TELEPHONE (OUTPATIENT)
Dept: CARDIOLOGY | Facility: CLINIC | Age: 87
End: 2022-02-24
Payer: MEDICARE

## 2022-02-24 NOTE — TELEPHONE ENCOUNTER
"[facility] Call Center    Phone Message    May a detailed message be left on voicemail: yes     Reason for Call: Medication Question or concern regarding medication   Prescription Clarification  Name of Medication: olmesartan (BENICAR) 20 MG tablet  amLODIPine (NORVASC) 2.5 MG tablet  Prescribing Provider: Dr Feliciano   What on the order needs clarification? Pt's daughter wants to know what is to low for a BP?  Wants to know how pt should be taking this medication because pt's BP has been dropping after she takes the medication and patient feels different when BP is lower   It may drop from 171 to 108    Action Taken: Other: cardiology    Travel Screening: Not Applicable                          Called back Naye to address her concerns. She states that her Mother, Geneva, started Amlodipine 2.5 mg daily in addition to her Olmesartan 20 mg BID. She will occasionally get lower readings in the low 100's and she does report dizziness to Naye. Naye also admits that Geneva worries a lot and checks her blood pressure often throughout the day. She did not have the log in front of her, but she believes that Geneva still gets higher numbers when she is \"worked up\".     Writer encouraged Naye to get a log with times of when blood pressures are taken. Also mentioned that perhaps she should only check BP a few times a week, or when she feels dizzy. Naye states that she has tried to recommend this to Geneva, but she continues to check it frequently throughout the day.     Naye just wonders if there is a number or a threshold when Ophelias BP is too low and she should not take the Amlodipine. Will route to [facility] for review. -[facility]      Dr. Feliciano,  See above. Would you recommend that Geneva have any sort of hold parameters on her Amlodipine? We discussed how low 100's is not a critically low systolic blood pressure but Geneva reports feeling off and dizzy when it is within that range.  Thanks,  Mal "

## 2022-02-25 NOTE — TELEPHONE ENCOUNTER
Called patient's daughter, Naye, and discussed changing Amlodipine to at bedtime. She will let Geneva know and call back with any questions or concerns. -[facility]

## 2022-04-19 DIAGNOSIS — I10 ESSENTIAL HYPERTENSION: ICD-10-CM

## 2022-04-19 RX ORDER — OLMESARTAN MEDOXOMIL 20 MG/1
20 TABLET ORAL 2 TIMES DAILY
Qty: 180 TABLET | Refills: 1 | Status: SHIPPED | OUTPATIENT
Start: 2022-04-19 | End: 2022-07-07

## 2022-07-07 ENCOUNTER — TELEPHONE (OUTPATIENT)
Dept: CARDIOLOGY | Facility: CLINIC | Age: 87
End: 2022-07-07

## 2022-07-07 DIAGNOSIS — I10 ESSENTIAL HYPERTENSION: ICD-10-CM

## 2022-07-07 RX ORDER — OLMESARTAN MEDOXOMIL 40 MG/1
20 TABLET ORAL 2 TIMES DAILY
Qty: 90 TABLET | Refills: 0 | Status: SHIPPED | OUTPATIENT
Start: 2022-07-07 | End: 2022-09-30

## 2022-07-07 NOTE — TELEPHONE ENCOUNTER
Health Call Center    Phone Message    May a detailed message be left on voicemail: yes     Reason for Call: Medication Question or concern regarding medication   Prescription Clarification  Name of Medication: olmesartan (BENICAR) 20 MG tablet  Prescribing Provider: Jodie    Pharmacy: [facility] #[id number] - [facility] [address] AT [facility] OF [address]   What on the order needs clarification? Would like to discuss the cost of the medication with . Please call Naye back to discuss further, thank you.    Action Taken: Message routed to:  Other: Cardiology    Travel Screening: Not Applicable                                                                    Called back Naye to address her concerns. She says that 20 mg tablets of Olmesartan are really expensive- over 700 dollars. She was told x1 40 mg tablet would be cheaper. She will need to split this. Rx updated to be a 40 mg tablet and to split to get 20 mg BID at their request. -[facility] [place] [facility]

## 2022-07-12 ENCOUNTER — TELEPHONE (OUTPATIENT)
Dept: CARDIOLOGY | Facility: CLINIC | Age: 87
End: 2022-07-12

## 2022-07-13 NOTE — TELEPHONE ENCOUNTER
PA Initiation    Medication: olmesartan (BENICAR) 20 MG tablet - INITIATED  Insurance Company: [employer] - Phone [phone] Fax [phone]  Pharmacy Filling the Rx: [facility] #[id number] - [place], [place] - [address] AT [facility] [address] [facility]  Filling Pharmacy Phone: [phone]  Filling Pharmacy Fax:    Start Date: 7/13/2022

## 2022-07-13 NOTE — TELEPHONE ENCOUNTER
Prior Authorization Approval    Authorization Effective Date: 4/14/2022  Authorization Expiration Date: 7/13/2023  Medication: olmesartan (BENICAR) 20 MG tablet - APPROVED  Insurance Company: [employer] - Phone [phone] Fax [phone]  Which Pharmacy is filling the prescription (Not needed for infusion/clinic administered): [id number] - [facility] [address] AT [address]  Pharmacy Notified: Yes  Patient Notified: Yes (pharmacy will notify patient when ready)

## 2022-09-30 ENCOUNTER — OFFICE VISIT (OUTPATIENT)
Dept: CARDIOLOGY | Facility: CLINIC | Age: 87
End: 2022-09-30
Payer: MEDICARE

## 2022-09-30 VITALS
HEIGHT: 68 IN | SYSTOLIC BLOOD PRESSURE: 172 MMHG | BODY MASS INDEX: 22.58 KG/M2 | RESPIRATION RATE: 16 BRPM | HEART RATE: 66 BPM | WEIGHT: 149 LBS | DIASTOLIC BLOOD PRESSURE: 78 MMHG

## 2022-09-30 DIAGNOSIS — I10 ESSENTIAL HYPERTENSION: Primary | ICD-10-CM

## 2022-09-30 PROCEDURE — 99214 OFFICE O/P EST MOD 30 MIN: CPT | Performed by: INTERNAL MEDICINE

## 2022-09-30 RX ORDER — LOSARTAN POTASSIUM 100 MG/1
100 TABLET ORAL DAILY
Qty: 30 TABLET | Refills: 11 | Status: SHIPPED | OUTPATIENT
Start: 2022-09-30 | End: 2023-01-05 | Stop reason: ALTCHOICE

## 2022-09-30 RX ORDER — AMLODIPINE BESYLATE 2.5 MG/1
2.5 TABLET ORAL DAILY
Qty: 90 TABLET | Refills: 3 | Status: SHIPPED | OUTPATIENT
Start: 2022-09-30 | End: 2023-10-03

## 2022-09-30 NOTE — PROGRESS NOTES
"    Cardiology Progress Note     Assessment:    Hypertension, probably adequate control based on her home readings  Orthostatic lightheadedness, resolved  Sinus bradycardia, mild, resolved after discontinuation of atenolol  Aortic regurgitation, mild to moderate, asymptomatic, unchanged exam today    Plan:  Change Benicar to losartan 100 mg a day to reduce the cost of the antihypertensive medications    Continue amlodipine at current dose    Follow-up in 12 months    Subjective:   This is 89 year old female who comes in today for follow-up visit.  She denies any chest symptoms.  Specifically she had no chest pain or shortness of breath.  She checks blood pressure at home.  Systolic blood pressure fluctuates from 1 20-1 60 diastolic is generally under 85.  She denies any syncope or near syncope.  She has not had heart palpitations  She states that she pay $700 for 1 month of Benicar.    Review of Systems:   Negative other than history of present illness    Objective:   BP (!) 172/78 (BP Location: Right arm, Patient Position: Sitting, Cuff Size: Adult Regular)   Pulse 66   Resp 16   Ht 1.727 m (5' 8\")   Wt 67.6 kg (149 lb)   BMI 22.66 kg/m    Physical Exam:  GENERAL: no distress  NECK: No JVD  LUNGS: Clear to auscultation.  CARDIAC: regular rhythm, S1 & S2 normal.  No heaves, thrills, gallops.  Soft ejection murmur at the aortic area  ABDOMEN: flat, negative hepatosplenomegaly, soft and non-tender.  EXTREMITIES: No evidence of cyanosis, clubbing or edema.    Current Outpatient Medications   Medication Sig Dispense Refill     amLODIPine (NORVASC) 2.5 MG tablet TAKE 1 TABLET(2.5 MG) BY MOUTH DAILY 90 tablet 3     CHONDROITIN SULFATE A ORAL [CHONDROITIN SULFATE A ORAL] Take 1 tablet by mouth daily.       cinnamon bark (CINNAMON) 500 mg capsule [CINNAMON BARK (CINNAMON) 500 MG CAPSULE] Take 500 mg by mouth daily.       ergocalciferol, vitamin D2, 10 mcg (400 unit) Tab [ERGOCALCIFEROL, VITAMIN D2, 10 MCG (400 UNIT) " TAB] Take 1 tablet by mouth daily.       flaxseed oil 1,000 mg cap [FLAXSEED OIL 1,000 MG CAP] Take 1 tablet by mouth daily.       losartan (COZAAR) 100 MG tablet Take 1 tablet (100 mg) by mouth daily 30 tablet 11     olmesartan (BENICAR) 40 MG tablet Take 0.5 tablets (20 mg) by mouth 2 times daily 90 tablet 0     vitamin E 400 UNIT capsule [VITAMIN E 400 UNIT CAPSULE] Take 1 capsule by mouth daily.         Cardiographics:    ECG: June 2020 sinus bradycardia with sinus arrhythmia    Echocardiogram: June 2020    1.Left ventricle ejection fraction is normal. The calculated left ventricular ejection fraction is 67%.    2.TAPSE is normal, which is consistent with normal right ventricular systolic function.    3.Minimal bowing/prolapse of posterior mitral valve leaflet without any significant mitral regurgitation.    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    5.No previous study for comparison.    Normal CVP       Lab Results    Chemistry/lipid CBC Cardiac Enzymes/BNP/TSH/INR   No results for input(s): CHOL, HDL, LDL, TRIG, CHOLHDLRATIO in the last 69585 hours.  No results for input(s): LDL in the last 37822 hours.  No results for input(s): NA, POTASSIUM, CHLORIDE, CO2, GLC, BUN, CR, GFRESTIMATED, TREY in the last 67815 hours.    Invalid input(s): GRFESTBLACK  No results for input(s): CR in the last 12680 hours.  No results for input(s): A1C in the last 09676 hours.       No results for input(s): WBC, HGB, HCT, MCV, PLT in the last 17135 hours.  No results for input(s): HGB in the last 49639 hours. No results for input(s): TROPONINI in the last 70378 hours.  No results for input(s): BNP, NTBNPI, NTBNP in the last 38321 hours.  No results for input(s): TSH in the last 35447 hours.  No results for input(s): INR in the last 89648 hours.

## 2022-09-30 NOTE — PATIENT INSTRUCTIONS
Geneva Stearns,    It was a pleasure to see you today at the [facility].     My recommendations after this visit include:    Change olmesartan to losartan to safe money    ROBLES Feliciano MD, FACC, [facility]

## 2022-09-30 NOTE — LETTER
"9/30/2022    Yee Nolasco MD  [facility] [address]  [place] [address]    RE: Geneva Stearns       Dear Colleague,     I had the pleasure of seeing Geneva ABA Stearns in the [facility].      Cardiology Progress Note     Assessment:    Hypertension, probably adequate control based on her home readings  Orthostatic lightheadedness, resolved  Sinus bradycardia, mild, resolved after discontinuation of atenolol  Aortic regurgitation, mild to moderate, asymptomatic, unchanged exam today    Plan:  Change Benicar to losartan 100 mg a day to reduce the cost of the antihypertensive medications    Continue amlodipine at current dose    Follow-up in 12 months    Subjective:   This is 89 year old female who comes in today for follow-up visit.  She denies any chest symptoms.  Specifically she had no chest pain or shortness of breath.  She checks blood pressure at home.  Systolic blood pressure fluctuates from 1 20-1 60 diastolic is generally under 85.  She denies any syncope or near syncope.  She has not had heart palpitations  She states that she pay $700 for 1 month of Benicar.    Review of Systems:   Negative other than history of present illness    Objective:   BP (!) 172/78 (BP Location: Right arm, Patient Position: Sitting, Cuff Size: Adult Regular)   Pulse 66   Resp 16   Ht 1.727 m (5' 8\")   Wt 67.6 kg (149 lb)   BMI 22.66 kg/m    Physical Exam:  GENERAL: no distress  NECK: No JVD  LUNGS: Clear to auscultation.  CARDIAC: regular rhythm, S1 & S2 normal.  No heaves, thrills, gallops.  Soft ejection murmur at the aortic area  ABDOMEN: flat, negative hepatosplenomegaly, soft and non-tender.  EXTREMITIES: No evidence of cyanosis, clubbing or edema.    Current Outpatient Medications   Medication Sig Dispense Refill     amLODIPine (NORVASC) 2.5 MG tablet TAKE 1 TABLET(2.5 MG) BY MOUTH DAILY 90 tablet 3     CHONDROITIN SULFATE A ORAL [CHONDROITIN SULFATE A ORAL] Take 1 tablet by " mouth daily.       cinnamon bark (CINNAMON) 500 mg capsule [CINNAMON BARK (CINNAMON) 500 MG CAPSULE] Take 500 mg by mouth daily.       ergocalciferol, vitamin D2, 10 mcg (400 unit) Tab [ERGOCALCIFEROL, VITAMIN D2, 10 MCG (400 UNIT) TAB] Take 1 tablet by mouth daily.       flaxseed oil 1,000 mg cap [FLAXSEED OIL 1,000 MG CAP] Take 1 tablet by mouth daily.       losartan (COZAAR) 100 MG tablet Take 1 tablet (100 mg) by mouth daily 30 tablet 11     olmesartan (BENICAR) 40 MG tablet Take 0.5 tablets (20 mg) by mouth 2 times daily 90 tablet 0     vitamin E 400 UNIT capsule [VITAMIN E 400 UNIT CAPSULE] Take 1 capsule by mouth daily.         Cardiographics:    ECG: June 2020 sinus bradycardia with sinus arrhythmia    Echocardiogram: June 2020    1.Left ventricle ejection fraction is normal. The calculated left ventricular ejection fraction is 67%.    2.TAPSE is normal, which is consistent with normal right ventricular systolic function.    3.Minimal bowing/prolapse of posterior mitral valve leaflet without any significant mitral regurgitation.    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    5.No previous study for comparison.    Normal CVP       Lab Results    Chemistry/lipid CBC Cardiac Enzymes/BNP/TSH/INR   No results for input(s): CHOL, HDL, LDL, TRIG, CHOLHDLRATIO in the last 87059 hours.  No results for input(s): LDL in the last 14760 hours.  No results for input(s): NA, POTASSIUM, CHLORIDE, CO2, GLC, BUN, CR, GFRESTIMATED, TREY in the last 52988 hours.    Invalid input(s): GRFESTBLACK  No results for input(s): CR in the last 21459 hours.  No results for input(s): A1C in the last 24172 hours.       No results for input(s): WBC, HGB, HCT, MCV, PLT in the last 50052 hours.  No results for input(s): HGB in the last 88116 hours. No results for input(s): TROPONINI in the last 03219 hours.  No results for input(s): BNP, NTBNPI, NTBNP in the  last 72071 hours.  No results for input(s): TSH in the last 57958 hours.  No results for input(s): INR in the last 04538 hours.             Thank you for allowing me to participate in the care of your patient.      Sincerely,   Andrae Feliciano MD   [facility]  cc:   Andrae Feliciano MD  [address] [address]

## 2022-10-24 ENCOUNTER — TELEPHONE (OUTPATIENT)
Dept: CARDIOLOGY | Facility: CLINIC | Age: 87
End: 2022-10-24

## 2022-10-24 NOTE — TELEPHONE ENCOUNTER
Returned call to Naye. She inquired if the losartan needed to be taken morning or evening. We discussed that it could be either and if, based on blood pressure, she could adjust it accordingly. Naye verbalized understanding. -[facility]

## 2022-10-24 NOTE — TELEPHONE ENCOUNTER
Health Call Center    Phone Message    May a detailed message be left on voicemail: yes     Reason for Call: Medication Question or concern regarding medication   Prescription Clarification  Name of Medication: New blood pressure med  Prescribing Provider: Dr. Feliciano   Pharmacy: \   What on the order needs clarification?   Naye states that her mother is starting a new B/P med tomorrow and has some questions for the nurse. Please call her to discuss.          Action Taken: Other: cardiology    Travel Screening: Not Applicable   Thank you!  [facility]

## 2023-01-05 ENCOUNTER — TELEPHONE (OUTPATIENT)
Dept: CARDIOLOGY | Facility: CLINIC | Age: 88
End: 2023-01-05

## 2023-01-05 DIAGNOSIS — I10 ESSENTIAL HYPERTENSION: Primary | ICD-10-CM

## 2023-01-05 RX ORDER — OLMESARTAN MEDOXOMIL 20 MG/1
20 TABLET ORAL 2 TIMES DAILY
Qty: 60 TABLET | Refills: 11 | Status: SHIPPED | OUTPATIENT
Start: 2023-01-05 | End: 2023-12-01 | Stop reason: ALTCHOICE

## 2023-01-05 NOTE — TELEPHONE ENCOUNTER
Called back Naye to address her concerns. She states that they switched Geneva from Olmesartan to Losartan in September due to cost. She was previously on 20 mg bid or half a 40 mg tablet twice daily. Naye says that she is able to try for a tier exception and Geneva would prefer to be back on Olmesartan if she can afford it. Will route to Dr. Feliciano for order and then will place. They request the 20 mg tablets to start. If we need to change to 1/2 of a 40 mg tablet due to insurance, we will then go that route. -[facility]          Dr. Feliciano,  Patient is requesting to switch back to Olmesartan 20 mg twice daily from Losartan 100 mg daily. Switch was done back in September due to cost patient and daughter believe they can get it at a lower tier now. Ok to make this change?  Thanks,  Mal

## 2023-01-05 NOTE — TELEPHONE ENCOUNTER
[facility] Call Center    Phone Message    May a detailed message be left on voicemail: yes     Reason for Call: Other:     Naye is requesting a call back to discuss changing losartan back to olmasarton 2x daily as soon as possible.    Action Taken: Other: cardio    Travel Screening: Not Applicable     Thank you!  [facility]

## 2023-01-05 NOTE — TELEPHONE ENCOUNTER
Rx sent in for Olmesartan BID. Losartan removed from medication list. Informed Naye of this and she verbalized understanding and will call back if there are any issues with cost etc. She was instructed to make a clean switch from Losartan to Olmesartan the next day. -[facility]

## 2023-07-26 ENCOUNTER — TELEPHONE (OUTPATIENT)
Dept: CARDIOLOGY | Facility: CLINIC | Age: 88
End: 2023-07-26
Payer: MEDICARE

## 2023-07-26 NOTE — TELEPHONE ENCOUNTER
PA Initiation    Medication:  Olmesartan  Insurance Company:    Pharmacy Filling the Rx:  [facility]  Filling Pharmacy Phone:  [phone]  Filling Pharmacy Fax:  [phone]  Start Date:

## 2023-07-30 NOTE — TELEPHONE ENCOUNTER
PA Initiation    Medication: OLMESARTAN MEDOXOMIL 20 MG PO TABS  Insurance Company: [employer] - Phone [phone] Fax [phone]  Pharmacy Filling the Rx: [facility] #[id number] - [place], [place] - [facility] AT [facility] [place] [place] OF [address] [facility]  Filling Pharmacy Phone: [phone]  Filling Pharmacy Fax: [phone]  Start Date: 7/30/2023

## 2023-07-31 DIAGNOSIS — I10 ESSENTIAL HYPERTENSION: ICD-10-CM

## 2023-07-31 NOTE — TELEPHONE ENCOUNTER
Prior Authorization Approval    Medication: OLMESARTAN MEDOXOMIL 20 MG PO TABS  Authorization Effective Date: 5/2/2023  Authorization Expiration Date: 7/31/2024  Approved Dose/Quantity:   Reference #:     Insurance Company: [employer] - Phone [phone] Fax [phone]  Expected CoPay:       CoPay Card Available:      Financial Assistance Needed:   Which Pharmacy is filling the prescription: [facility] #[id number] [facility] [address] [place] [address] AT [facility] OF [address] [facility]  Pharmacy Notified: Yes  Patient Notified: Yes **Instructed pharmacy to notify patient when script is ready to /ship.**

## 2023-08-01 RX ORDER — OLMESARTAN MEDOXOMIL 20 MG/1
TABLET ORAL
Qty: 60 TABLET | Refills: 11 | OUTPATIENT
Start: 2023-08-01

## 2023-08-01 RX ORDER — OLMESARTAN MEDOXOMIL 20 MG/1
TABLET ORAL
Qty: 180 TABLET | OUTPATIENT
Start: 2023-08-01

## 2023-10-02 NOTE — PROGRESS NOTES
Assessment/Recommendations   Assessment:    1.  Hypertension: BP today is 128/60.  She is on Amlodipine 2.5 mg daily at bed time  Benicar 40 mg twice a day    Last BMP stable in 2020.    2.  Mild to moderate aortic insufficiency per echo in June 2020: Asymptomatic.    Plan/Recommendation:  -BMP pending  -Continue current hypertension regimen.  Amlodipine prescription reviewed  -Instructed to call back around mid December to switch Benicar to losartan due to insurance change in coverage.  -We will plan to switch her to losartan 50 mg twice a day (per patient's preference) once she completes her supply of Benicar in December.  -Follow-up with PCP for annual checkup    Follow up with Dr. Feliciano in 1 year or sooner if needed      History of Present Illness/Subjective    Ms. Geneva Stearns is a 89 year old female with a past medical history of mild sinus bradycardia (resolved after discontinuation of atenolol), orthostatic lightheadedness-resolved, mild to moderate aortic regurgitation per echo in June 2020, and hypertension who is seen at [facility] for 1 year follow-up for Dr. Feliciano.    Today, Geneva is here accompanied by her daughter.  She states she has been doing well since she saw Dr. Feliciano last year September. She denies fatigue, lightheadedness, shortness of breath, dyspnea on exertion, orthopnea, PND, palpitations, chest pain, abdominal fullness/bloating, and lower extremity edema.  She remains very active at home doing household chores and also she spends a lot of time working in the yard.  She is able to tolerate activities without any cardiac symptoms.  Her appetite is fair.    She states she has not followed up with PCP for at least 2 years.    ECHO from 6/2020-Reviewed:   Narrative & Impression    1.Left ventricle ejection fraction is normal. The calculated left ventricular ejection fraction is 67%.    2.TAPSE is normal, which is consistent with  normal right ventricular systolic function.    3.Minimal bowing/prolapse of posterior mitral valve leaflet without any significant mitral regurgitation.    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    5.No previous study for comparison.     Physical Examination Review of Systems   /60 (BP Location: Right arm, Patient Position: Sitting, Cuff Size: Adult Regular)   Pulse 63   Resp 16   Wt 68.9 kg (152 lb)   SpO2 96%   BMI 23.11 kg/m    Body mass index is 23.11 kg/m .  Wt Readings from Last 3 Encounters:   10/03/23 68.9 kg (152 lb)   09/30/22 67.6 kg (149 lb)   12/06/21 66.2 kg (146 lb)     General Appearance:   no distress, normal body habitus   ENT/Mouth: membranes moist, no oral lesions or bleeding gums.      EYES:  no scleral icterus, normal conjunctivae   Neck: no carotid bruits or thyromegaly   Chest/Lungs:   lungs are clear to auscultation, no rales or wheezing, equal chest wall expansion    Cardiovascular:   Heart rate regular. Normal first and second heart sounds with no murmurs, rubs, or gallops; the carotid, radial and posterior tibial pulses are intact, Jugular venous pressure flat with no edema bilaterally    Abdomen:  no organomegaly, masses, bruits, or tenderness; bowel sounds are present   Extremities   no cyanosis or clubbing   Radial pulses and Pedal pulses intact and symmetrical.  CMS intact.   Skin: no xanthelasma, warm.    Neurologic: normal  bilateral, no tremors     Psychiatric: alert and oriented x3, calm                                                    Negative unless noted in HPI     Medical History  Surgical History Family History Social History   No past medical history on file. No past surgical history on file. No family history on file. Social History     Socioeconomic History    Marital status:      Spouse name: Not on file    Number of children: Not on file    Years of education:  Not on file    Highest education level: Not on file   Occupational History    Not on file   Tobacco Use    Smoking status: Never    Smokeless tobacco: Never   Substance and Sexual Activity    Alcohol use: Not Currently    Drug use: Never    Sexual activity: Not on file   Other Topics Concern    Not on file   Social History Narrative    Not on file     Social Determinants of Health     Financial Resource Strain: Not on file   Food Insecurity: Not on file   Transportation Needs: Not on file   Physical Activity: Not on file   Stress: Not on file   Social Connections: Not on file   Interpersonal Safety: Not on file   Housing Stability: Not on file          Medications  Allergies   Current Outpatient Medications   Medication Sig Dispense Refill    amLODIPine (NORVASC) 2.5 MG tablet Take 1 tablet (2.5 mg) by mouth daily 90 tablet 3    Multiple Vitamins-Minerals (PRESERVISION AREDS 2 PO)       olmesartan (BENICAR) 20 MG tablet Take 1 tablet (20 mg) by mouth 2 times daily 60 tablet 11    CHONDROITIN SULFATE A ORAL [CHONDROITIN SULFATE A ORAL] Take 1 tablet by mouth daily. (Patient not taking: Reported on 10/3/2023)      cinnamon bark (CINNAMON) 500 mg capsule [CINNAMON BARK (CINNAMON) 500 MG CAPSULE] Take 500 mg by mouth daily. (Patient not taking: Reported on 10/3/2023)      ergocalciferol, vitamin D2, 10 mcg (400 unit) Tab [ERGOCALCIFEROL, VITAMIN D2, 10 MCG (400 UNIT) TAB] Take 1 tablet by mouth daily. (Patient not taking: Reported on 10/3/2023)      flaxseed oil 1,000 mg cap [FLAXSEED OIL 1,000 MG CAP] Take 1 tablet by mouth daily. (Patient not taking: Reported on 10/3/2023)      vitamin E 400 UNIT capsule [VITAMIN E 400 UNIT CAPSULE] Take 1 capsule by mouth daily. (Patient not taking: Reported on 10/3/2023)      Allergies   Allergen Reactions    Simvastatin Other (See Comments)     Severe nose bleeds    Nitrofurantoin Other (See Comments)     Pt states medication causes profuse sweating, and fever.     Sulfa  Antibiotics      PN: LW Reaction: Diaphoresis         Lab Results    Chemistry/lipid CBC Cardiac Enzymes/BNP/TSH/INR   No results found for: CHOL, HDL, TRIG, CHOLHDL, CREATININE, BUN, NA, CO2 No results found for: WBC, HGB, HCT, MCV, PLT No results found for: CKTOTAL, CKMB, TROPONINI, BNP, TSH, INR     35  minutes spent on the date of encounter doing chart review, review of test results, interpretation with above tests, patient visit, documentation, and discussion with family.        This note has been dictated using voice recognition software. Any grammatical, typographical, or context distortions are unintentional and inherent to the software

## 2023-10-03 ENCOUNTER — OFFICE VISIT (OUTPATIENT)
Dept: CARDIOLOGY | Facility: CLINIC | Age: 88
End: 2023-10-03
Payer: MEDICARE

## 2023-10-03 VITALS
OXYGEN SATURATION: 96 % | HEART RATE: 63 BPM | BODY MASS INDEX: 23.11 KG/M2 | SYSTOLIC BLOOD PRESSURE: 128 MMHG | RESPIRATION RATE: 16 BRPM | DIASTOLIC BLOOD PRESSURE: 60 MMHG | WEIGHT: 152 LBS

## 2023-10-03 DIAGNOSIS — I35.1 AORTIC VALVE INSUFFICIENCY, ETIOLOGY OF CARDIAC VALVE DISEASE UNSPECIFIED: ICD-10-CM

## 2023-10-03 DIAGNOSIS — I10 PRIMARY HYPERTENSION: Primary | ICD-10-CM

## 2023-10-03 LAB
ANION GAP SERPL CALCULATED.3IONS-SCNC: 15 MMOL/L (ref 7–15)
BUN SERPL-MCNC: 27.1 MG/DL (ref 8–23)
CALCIUM SERPL-MCNC: 9.3 MG/DL (ref 8.2–9.6)
CHLORIDE SERPL-SCNC: 100 MMOL/L (ref 98–107)
CREAT SERPL-MCNC: 0.93 MG/DL (ref 0.51–0.95)
DEPRECATED HCO3 PLAS-SCNC: 23 MMOL/L (ref 22–29)
EGFRCR SERPLBLD CKD-EPI 2021: 58 ML/MIN/1.73M2
GLUCOSE SERPL-MCNC: 129 MG/DL (ref 70–99)
POTASSIUM SERPL-SCNC: 4.4 MMOL/L (ref 3.4–5.3)
SODIUM SERPL-SCNC: 138 MMOL/L (ref 135–145)

## 2023-10-03 PROCEDURE — 36415 COLL VENOUS BLD VENIPUNCTURE: CPT | Performed by: NURSE PRACTITIONER

## 2023-10-03 PROCEDURE — 99214 OFFICE O/P EST MOD 30 MIN: CPT | Performed by: NURSE PRACTITIONER

## 2023-10-03 PROCEDURE — 80048 BASIC METABOLIC PNL TOTAL CA: CPT | Performed by: NURSE PRACTITIONER

## 2023-10-03 RX ORDER — AMLODIPINE BESYLATE 2.5 MG/1
2.5 TABLET ORAL DAILY
Qty: 90 TABLET | Refills: 3 | Status: SHIPPED | OUTPATIENT
Start: 2023-10-03

## 2023-10-03 NOTE — LETTER
10/3/2023    Yee Nolasco MD  [address]  [place] [address]    RE: Geneva Stearns       Dear Colleague,     I had the pleasure of seeing Geneva Stearns in the [facility].          Assessment/Recommendations   Assessment:    1.  Hypertension: BP today is 128/60.  She is on Amlodipine 2.5 mg daily at bed time  Benicar 40 mg twice a day    Last BMP stable in 2020.    2.  Mild to moderate aortic insufficiency per echo in June 2020: Asymptomatic.    Plan/Recommendation:  -BMP pending  -Continue current hypertension regimen.  Amlodipine prescription reviewed  -Instructed to call back around mid December to switch Benicar to losartan due to insurance change in coverage.  -We will plan to switch her to losartan 50 mg twice a day (per patient's preference) once she completes her supply of Benicar in December.  -Follow-up with PCP for annual checkup    Follow up with Dr. Feliciano in 1 year or sooner if needed      History of Present Illness/Subjective    Ms. Geneva Stearns is a 89 year old female with a past medical history of mild sinus bradycardia (resolved after discontinuation of atenolol), orthostatic lightheadedness-resolved, mild to moderate aortic regurgitation per echo in June 2020, and hypertension who is seen at [facility] for 1 year follow-up for Dr. Feliciano.    Today, Geneva is here accompanied by her daughter.  She states she has been doing well since she saw Dr. Feliciano last year September. She denies fatigue, lightheadedness, shortness of breath, dyspnea on exertion, orthopnea, PND, palpitations, chest pain, abdominal fullness/bloating, and lower extremity edema.  She remains very active at home doing household chores and also she spends a lot of time working in the yard.  She is able to tolerate activities without any cardiac symptoms.  Her appetite is fair.    She states she has not followed up with PCP for at least 2  years.    ECHO from 6/2020-Reviewed:   Narrative & Impression    1.Left ventricle ejection fraction is normal. The calculated left ventricular ejection fraction is 67%.    2.TAPSE is normal, which is consistent with normal right ventricular systolic function.    3.Minimal bowing/prolapse of posterior mitral valve leaflet without any significant mitral regurgitation.    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    5.No previous study for comparison.     Physical Examination Review of Systems   /60 (BP Location: Right arm, Patient Position: Sitting, Cuff Size: Adult Regular)   Pulse 63   Resp 16   Wt 68.9 kg (152 lb)   SpO2 96%   BMI 23.11 kg/m    Body mass index is 23.11 kg/m .  Wt Readings from Last 3 Encounters:   10/03/23 68.9 kg (152 lb)   09/30/22 67.6 kg (149 lb)   12/06/21 66.2 kg (146 lb)     General Appearance:   no distress, normal body habitus   ENT/Mouth: membranes moist, no oral lesions or bleeding gums.      EYES:  no scleral icterus, normal conjunctivae   Neck: no carotid bruits or thyromegaly   Chest/Lungs:   lungs are clear to auscultation, no rales or wheezing, equal chest wall expansion    Cardiovascular:   Heart rate regular. Normal first and second heart sounds with no murmurs, rubs, or gallops; the carotid, radial and posterior tibial pulses are intact, Jugular venous pressure flat with no edema bilaterally    Abdomen:  no organomegaly, masses, bruits, or tenderness; bowel sounds are present   Extremities   no cyanosis or clubbing   Radial pulses and Pedal pulses intact and symmetrical.  CMS intact.   Skin: no xanthelasma, warm.    Neurologic: normal  bilateral, no tremors     Psychiatric: alert and oriented x3, calm                                                    Negative unless noted in HPI     Medical History  Surgical History Family History Social History   No past medical history on file.  No past surgical history on file. No family history on file. Social History     Socioeconomic History    Marital status:      Spouse name: Not on file    Number of children: Not on file    Years of education: Not on file    Highest education level: Not on file   Occupational History    Not on file   Tobacco Use    Smoking status: Never    Smokeless tobacco: Never   Substance and Sexual Activity    Alcohol use: Not Currently    Drug use: Never    Sexual activity: Not on file   Other Topics Concern    Not on file   Social History Narrative    Not on file     Social Determinants of Health     Financial Resource Strain: Not on file   Food Insecurity: Not on file   Transportation Needs: Not on file   Physical Activity: Not on file   Stress: Not on file   Social Connections: Not on file   Interpersonal Safety: Not on file   Housing Stability: Not on file          Medications  Allergies   Current Outpatient Medications   Medication Sig Dispense Refill    amLODIPine (NORVASC) 2.5 MG tablet Take 1 tablet (2.5 mg) by mouth daily 90 tablet 3    Multiple Vitamins-Minerals (PRESERVISION AREDS 2 PO)       olmesartan (BENICAR) 20 MG tablet Take 1 tablet (20 mg) by mouth 2 times daily 60 tablet 11    CHONDROITIN SULFATE A ORAL [CHONDROITIN SULFATE A ORAL] Take 1 tablet by mouth daily. (Patient not taking: Reported on 10/3/2023)      cinnamon bark (CINNAMON) 500 mg capsule [CINNAMON BARK (CINNAMON) 500 MG CAPSULE] Take 500 mg by mouth daily. (Patient not taking: Reported on 10/3/2023)      ergocalciferol, vitamin D2, 10 mcg (400 unit) Tab [ERGOCALCIFEROL, VITAMIN D2, 10 MCG (400 UNIT) TAB] Take 1 tablet by mouth daily. (Patient not taking: Reported on 10/3/2023)      flaxseed oil 1,000 mg cap [FLAXSEED OIL 1,000 MG CAP] Take 1 tablet by mouth daily. (Patient not taking: Reported on 10/3/2023)      vitamin E 400 UNIT capsule [VITAMIN E 400 UNIT CAPSULE] Take 1 capsule by mouth daily. (Patient not taking: Reported on 10/3/2023)       Allergies   Allergen Reactions    Simvastatin Other (See Comments)     Severe nose bleeds    Nitrofurantoin Other (See Comments)     Pt states medication causes profuse sweating, and fever.     Sulfa Antibiotics      PN: LW Reaction: Diaphoresis         Lab Results    Chemistry/lipid CBC Cardiac Enzymes/BNP/TSH/INR   No results found for: CHOL, HDL, TRIG, CHOLHDL, CREATININE, BUN, NA, CO2 No results found for: WBC, HGB, HCT, MCV, PLT No results found for: CKTOTAL, CKMB, TROPONINI, BNP, TSH, INR     35  minutes spent on the date of encounter doing chart review, review of test results, interpretation with above tests, patient visit, documentation, and discussion with family.        This note has been dictated using voice recognition software. Any grammatical, typographical, or context distortions are unintentional and inherent to the software           Thank you for allowing me to participate in the care of your patient.      Sincerely,     EDDIE [facility]  cc:   No referring provider defined for this encounter.

## 2023-10-03 NOTE — PATIENT INSTRUCTIONS
Geneva Stearns,    It was a pleasure to see you today at the [facility].     My recommendations after this visit include:    - No medications changes made today    - We will follow up with you once your lab result is back    - Please call us back around Mid December regarding switching your Benicar to Losartan.    - Follow up with your primary provider for your annual check up    - Follow up with Dr. Feliciano in 1 year or sooner if needed    - Please call [phone], if you have any questions or concerns    Noel Samayoa, CNP

## 2023-10-04 DIAGNOSIS — I35.1 AORTIC VALVE INSUFFICIENCY, ETIOLOGY OF CARDIAC VALVE DISEASE UNSPECIFIED: ICD-10-CM

## 2023-10-04 DIAGNOSIS — I10 PRIMARY HYPERTENSION: Primary | ICD-10-CM

## 2023-11-16 ENCOUNTER — TELEPHONE (OUTPATIENT)
Dept: CARDIOLOGY | Facility: CLINIC | Age: 88
End: 2023-11-16
Payer: MEDICARE

## 2023-11-16 DIAGNOSIS — I10 PRIMARY HYPERTENSION: Primary | ICD-10-CM

## 2023-11-16 NOTE — TELEPHONE ENCOUNTER
[facility] Call Center    Phone Message    May a detailed message be left on voicemail: yes     Reason for Call: Other: Naye called requesting to speak with her mother's care team about some prescription changes that were made for her mother. Please reach out to Naye to discuss. Thank you!     Action Taken: Other: Cardiology    Travel Screening: Not Applicable    Thank you!  [facility]

## 2023-11-17 NOTE — TELEPHONE ENCOUNTER
Left message with Naye to see what updates can be forwarded on. Direct line left for call back. -[facility]

## 2023-11-30 NOTE — TELEPHONE ENCOUNTER
Daughter Naye called back, she states that the rx for Olmesartan needs to get changed to Losartan. This needs to be sent to  [id number] - [place], [place] - [facility] [address] [address] AT [address] [address] & [facility]. She states she called and left a VM on the # that was provided, but had not heard anything back. Please call the daughter back to confirm this has been taken care of.

## 2023-11-30 NOTE — TELEPHONE ENCOUNTER
"Called back Naye, patient's daughter, and was able to reach her. With recent insurance enrollment, Naye says they received a letter from Geneva's Medicare Plan that stated the price in Olmesartan will be going up after Jan 1. The plan prefers Losartan for significantly lower cost. They request a change to Losartan due to this. Currently, Geneva is on BID dosing of olmesartan. To \"not upset her too much\" Naye wonders if the losartan can be twice daily too. Will route. -[facility]            Dr. Feliciano,  See above. Ok to switch to Losartan from Olmesartan due to cost? In addition, could it be BID dosing per patient request?  Thanks,  Mal  "

## 2023-12-01 RX ORDER — LOSARTAN POTASSIUM 50 MG/1
50 TABLET ORAL 2 TIMES DAILY
Qty: 180 TABLET | Refills: 3 | Status: SHIPPED | OUTPATIENT
Start: 2023-12-01

## 2023-12-01 NOTE — TELEPHONE ENCOUNTER
Called Naye and updated on new Rx for Losartan. This was sent through and she verbalized understanding and Geneva will start once she runs out of Olmesartan. -[facility]

## 2024-03-01 ENCOUNTER — APPOINTMENT (OUTPATIENT)
Dept: RADIOLOGY | Facility: HOSPITAL | Age: 89
End: 2024-03-01
Attending: EMERGENCY MEDICINE
Payer: MEDICARE

## 2024-03-01 ENCOUNTER — TELEPHONE (OUTPATIENT)
Dept: CARDIOLOGY | Facility: CLINIC | Age: 89
End: 2024-03-01
Payer: MEDICARE

## 2024-03-01 ENCOUNTER — HOSPITAL ENCOUNTER (EMERGENCY)
Facility: HOSPITAL | Age: 89
Discharge: HOME | End: 2024-03-01
Attending: EMERGENCY MEDICINE | Admitting: EMERGENCY MEDICINE
Payer: MEDICARE

## 2024-03-01 ENCOUNTER — TRANSFERRED RECORDS (OUTPATIENT)
Dept: HEALTH INFORMATION MANAGEMENT | Facility: CLINIC | Age: 89
End: 2024-03-01

## 2024-03-01 VITALS
HEIGHT: 67 IN | SYSTOLIC BLOOD PRESSURE: 162 MMHG | RESPIRATION RATE: 20 BRPM | TEMPERATURE: 98.3 F | HEART RATE: 71 BPM | BODY MASS INDEX: 24.37 KG/M2 | OXYGEN SATURATION: 96 % | WEIGHT: 155.3 LBS | DIASTOLIC BLOOD PRESSURE: 74 MMHG

## 2024-03-01 DIAGNOSIS — R07.9 CHEST PAIN, UNSPECIFIED TYPE: ICD-10-CM

## 2024-03-01 LAB
ANION GAP SERPL CALCULATED.3IONS-SCNC: 13 MMOL/L (ref 7–15)
BASOPHILS # BLD AUTO: 0.1 10E3/UL (ref 0–0.2)
BASOPHILS NFR BLD AUTO: 1 %
BUN SERPL-MCNC: 16.3 MG/DL (ref 8–23)
CALCIUM SERPL-MCNC: 9.1 MG/DL (ref 8.2–9.6)
CHLORIDE SERPL-SCNC: 101 MMOL/L (ref 98–107)
CREAT SERPL-MCNC: 0.98 MG/DL (ref 0.51–0.95)
DEPRECATED HCO3 PLAS-SCNC: 26 MMOL/L (ref 22–29)
EGFRCR SERPLBLD CKD-EPI 2021: 54 ML/MIN/1.73M2
EOSINOPHIL # BLD AUTO: 0 10E3/UL (ref 0–0.7)
EOSINOPHIL NFR BLD AUTO: 0 %
ERYTHROCYTE [DISTWIDTH] IN BLOOD BY AUTOMATED COUNT: 13.2 % (ref 10–15)
GLUCOSE SERPL-MCNC: 167 MG/DL (ref 70–99)
HCT VFR BLD AUTO: 42.3 % (ref 35–47)
HGB BLD-MCNC: 13.7 G/DL (ref 11.7–15.7)
HOLD SPECIMEN: NORMAL
HOLD SPECIMEN: NORMAL
IMM GRANULOCYTES # BLD: 0 10E3/UL
IMM GRANULOCYTES NFR BLD: 0 %
LYMPHOCYTES # BLD AUTO: 0.8 10E3/UL (ref 0.8–5.3)
LYMPHOCYTES NFR BLD AUTO: 11 %
MCH RBC QN AUTO: 32.7 PG (ref 26.5–33)
MCHC RBC AUTO-ENTMCNC: 32.4 G/DL (ref 31.5–36.5)
MCV RBC AUTO: 101 FL (ref 78–100)
MONOCYTES # BLD AUTO: 0.6 10E3/UL (ref 0–1.3)
MONOCYTES NFR BLD AUTO: 8 %
NEUTROPHILS # BLD AUTO: 5.8 10E3/UL (ref 1.6–8.3)
NEUTROPHILS NFR BLD AUTO: 80 %
NRBC # BLD AUTO: 0 10E3/UL
NRBC BLD AUTO-RTO: 0 /100
PLATELET # BLD AUTO: 321 10E3/UL (ref 150–450)
POTASSIUM SERPL-SCNC: 4.1 MMOL/L (ref 3.4–5.3)
RBC # BLD AUTO: 4.19 10E6/UL (ref 3.8–5.2)
SODIUM SERPL-SCNC: 140 MMOL/L (ref 135–145)
TROPONIN T SERPL HS-MCNC: 14 NG/L
TROPONIN T SERPL HS-MCNC: 16 NG/L
WBC # BLD AUTO: 7.3 10E3/UL (ref 4–11)

## 2024-03-01 PROCEDURE — 36415 COLL VENOUS BLD VENIPUNCTURE: CPT | Performed by: STUDENT IN AN ORGANIZED HEALTH CARE EDUCATION/TRAINING PROGRAM

## 2024-03-01 PROCEDURE — 93005 ELECTROCARDIOGRAM TRACING: CPT | Performed by: STUDENT IN AN ORGANIZED HEALTH CARE EDUCATION/TRAINING PROGRAM

## 2024-03-01 PROCEDURE — 93005 ELECTROCARDIOGRAM TRACING: CPT | Performed by: EMERGENCY MEDICINE

## 2024-03-01 PROCEDURE — 71045 X-RAY EXAM CHEST 1 VIEW: CPT

## 2024-03-01 PROCEDURE — 99285 EMERGENCY DEPT VISIT HI MDM: CPT | Mod: 25

## 2024-03-01 PROCEDURE — 84484 ASSAY OF TROPONIN QUANT: CPT | Performed by: EMERGENCY MEDICINE

## 2024-03-01 PROCEDURE — 85004 AUTOMATED DIFF WBC COUNT: CPT | Performed by: EMERGENCY MEDICINE

## 2024-03-01 PROCEDURE — 36415 COLL VENOUS BLD VENIPUNCTURE: CPT | Performed by: EMERGENCY MEDICINE

## 2024-03-01 PROCEDURE — 80048 BASIC METABOLIC PNL TOTAL CA: CPT | Performed by: EMERGENCY MEDICINE

## 2024-03-01 ASSESSMENT — ACTIVITIES OF DAILY LIVING (ADL)
ADLS_ACUITY_SCORE: 35

## 2024-03-01 ASSESSMENT — COLUMBIA-SUICIDE SEVERITY RATING SCALE - C-SSRS
6. HAVE YOU EVER DONE ANYTHING, STARTED TO DO ANYTHING, OR PREPARED TO DO ANYTHING TO END YOUR LIFE?: NO
2. HAVE YOU ACTUALLY HAD ANY THOUGHTS OF KILLING YOURSELF IN THE PAST MONTH?: NO
1. IN THE PAST MONTH, HAVE YOU WISHED YOU WERE DEAD OR WISHED YOU COULD GO TO SLEEP AND NOT WAKE UP?: NO

## 2024-03-01 NOTE — TELEPHONE ENCOUNTER
Spoke with daughter and patient. Patient started having sharp sternal chest pain a couple nights in a row around 4 am. She is poor historian on these events however, she stated she took losartan in hopes of relieving her pain. No adverse effects mentioned after extra dosing. Reports not taking it this morning. She also mentions she has been experiencing diarrhea, a cough and general malaise. No BP available.     Advised to seek urgent care to possibly assess for infarct and follow up with this concern. Pt and daughter reported they will be visiting [facility] in [place]. Daughter will call scheduling for a follow up visit with Jodie.     Lars Samson RN [facility]

## 2024-03-01 NOTE — ED TRIAGE NOTES
W/c to triage.  Pt reports seen in urgent care with CP last night and sent here for further eval.  Pt denies CP or SOB or any other sx.      Triage Assessment (Adult)       Row Name 03/01/24 1539          Triage Assessment    Airway WDL WDL        Respiratory WDL    Respiratory WDL WDL        Skin Circulation/Temperature WDL    Skin Circulation/Temperature WDL WDL        Cardiac WDL    Cardiac WDL WDL        Peripheral/Neurovascular WDL    Peripheral Neurovascular WDL WDL        Cognitive/Neuro/Behavioral WDL    Cognitive/Neuro/Behavioral WDL WDL

## 2024-03-01 NOTE — TELEPHONE ENCOUNTER
[facility] Call Center    Phone Message    May a detailed message be left on voicemail: yes     Reason for Call: Other: Pt daughter called stating patient has had chest pain at 4am night before, but was not currently havinbg chest pain, but wanted to let her care team know, please call pt daughter  back for further discussion      Action Taken: Other: cardiology    Travel Screening: Not Applicable    Thank you!  [facility]

## 2024-03-01 NOTE — ED PROVIDER NOTES
EMERGENCY DEPARTMENT ENCOUNTER      NAME: Geneva Stearns  AGE: 89 year old female  YOB: 1932  MRN: [id number]  EVALUATION DATE & TIME: 3/1/2024  3:43 PM    PCP: Yee Nolasco    ED PROVIDER: Fina Pelayo M.D.      Chief Complaint   Patient presents with    Chest Pain         FINAL IMPRESSION:  1. Chest pain, unspecified type        ED COURSE & MEDICAL DECISION MAKING:    Pertinent Labs & Imaging studies reviewed. (See chart for details)  ED Course as of 03/01/24 2034   Fri Mar 01, 2024   1729 Patient is a very pleasant 89-year-old female with a history of hypertension who is pretty healthy at baseline.  She comes in after she had some chest pain at 4:00 this morning.  She denies any shortness of breath or nausea or vomiting.  She denies any cardiac history.  She does report that she thinks she has a history of diabetes.  She has not had any pain since.  She initially woke up with the pain and then went back to sleep and woke up at 10 AM and felt okay.  She is felt okay ever since.  She really wants to go home.  On my exam she looked well.  She had regular heart rate and rhythm and clear lung sounds and was not toxic appearing.  Initial troponin came back at 16 and I told her that since it was not under the range for women we should get a repeat at 2 hours.  She reluctantly agreed with that.  Will get a chest x-ray make sure there is no pneumonia or pneumothorax or other contributing cause but since she is improved significantly I am hopeful that the repeat troponin will come back okay and she will ultimately be able to get discharged home.  She did ask me what would happen if it became elevated and I told her that that we would admit her to the hospital at that point.  She is hoping that that will be the case.  She is in agreement with our plan.   1829 Repeat troponin is down to 14.  I will update the patient we will try to get her discharged home.   1832 Repeat troponin looks great and I  discussed that with the patient.  She is in agreement with plan for discharge home.  She will be discharged shortly.   5:26 PM I met with the patient for the initial interview and physical examination. Discussed plan for treatment and workup in the ED.     6:39 PM We discussed the plan for discharge and the patient is agreeable. Reviewed supportive cares, symptomatic treatment, outpatient follow up, and reasons to return to the Emergency Department. All questions and concerns were addressed. Patient to be discharged by ED RN.      Medical Decision Making    History:  Supplemental history from: Patient   External Record(s) reviewed: I reviewed the note from  [facility] 3/1/24     Work Up:  Emergent/Severe conditions considered and evaluated for: ACS, pneumonia, pneumothorax, myocarditis, esophageal rupture  I independently reviewed and interpreted EKG and chest x-ray which showed no pneumothorax.  See full radiology report for all details  In additional to work up documented, I considered the following work up: None  Medications given that require intensive monitoring for toxicity: None    External consultation:  Discussion of management with another provider: N/A     Complicating factors:  Care impacted by chronic illness: hypertension, diabetes mellitus, UTI  Care affected by social determinants of health: N/A     Disposition considerations: Considered admission but patient's repeat troponin was unremarkable and we were able to successfully discharged home.  Prescriptions considered/prescribed: None    At the conclusion of the encounter I discussed  the results of all of the tests and the disposition with patient.   All questions were answered.  The patient acknowledged understanding and was involved in the decision making regarding the overall care plan.      I discussed with patient the utility, limitations and findings of the exam/interventions/studies done during this visit as well as the list of  differential diagnosis and symptoms to monitor/return to ER for.  Additional verbal discharge instructions were provided.     MEDICATIONS GIVEN IN THE EMERGENCY:  Medications - No data to display    NEW PRESCRIPTIONS STARTED AT TODAY'S ER VISIT  Discharge Medication List as of 3/1/2024  6:39 PM             =================================================================    HPI    Triage Note: W/c to triage.  Pt reports seen in urgent care with CP last night and sent here for further eval.  Pt denies CP or SOB or any other sx.      Triage Assessment (Adult)       Row Name 03/01/24 1539          Triage Assessment    Airway WDL WDL        Respiratory WDL    Respiratory WDL WDL        Skin Circulation/Temperature WDL    Skin Circulation/Temperature WDL WDL        Cardiac WDL    Cardiac WDL WDL        Peripheral/Neurovascular WDL    Peripheral Neurovascular WDL WDL        Cognitive/Neuro/Behavioral WDL    Cognitive/Neuro/Behavioral WDL WDL                         Patient information was obtained from: patient     Use of : N/A       Geneva Stearns is a 89 year old female who presents with chest pain.     Per chart review;   Patient was seen at  [facility] 3/1/24 for chest pain last night, 7/10. EKG showed sinus rhythm with PAC. Took losartan at this time. Patient sent to the ED for further work-up.     Patient reports she woke up at 4 AM with sharp chest pain. Went away after she took a pill and went back to bed. Has had no pain since, shortness of breath, nausea, or vomiting. Denies cardiac history.     Patient lives alone and is able to manage well.     PAST MEDICAL HISTORY:  No past medical history on file.    PAST SURGICAL HISTORY:  No past surgical history on file.    CURRENT MEDICATIONS:    No current facility-administered medications for this encounter.    Current Outpatient Medications:     amLODIPine (NORVASC) 2.5 MG tablet, Take 1 tablet (2.5 mg) by mouth daily, Disp: 90 tablet, Rfl:  "3    CHONDROITIN SULFATE A ORAL, [CHONDROITIN SULFATE A ORAL] Take 1 tablet by mouth daily. (Patient not taking: Reported on 10/3/2023), Disp: , Rfl:     cinnamon bark (CINNAMON) 500 mg capsule, [CINNAMON BARK (CINNAMON) 500 MG CAPSULE] Take 500 mg by mouth daily. (Patient not taking: Reported on 10/3/2023), Disp: , Rfl:     ergocalciferol, vitamin D2, 10 mcg (400 unit) Tab, [ERGOCALCIFEROL, VITAMIN D2, 10 MCG (400 UNIT) TAB] Take 1 tablet by mouth daily. (Patient not taking: Reported on 10/3/2023), Disp: , Rfl:     flaxseed oil 1,000 mg cap, [FLAXSEED OIL 1,000 MG CAP] Take 1 tablet by mouth daily. (Patient not taking: Reported on 10/3/2023), Disp: , Rfl:     losartan (COZAAR) 50 MG tablet, Take 1 tablet (50 mg) by mouth 2 times daily, Disp: 180 tablet, Rfl: 3    Multiple Vitamins-Minerals (PRESERVISION AREDS 2 PO), , Disp: , Rfl:     vitamin E 400 UNIT capsule, [VITAMIN E 400 UNIT CAPSULE] Take 1 capsule by mouth daily. (Patient not taking: Reported on 10/3/2023), Disp: , Rfl:     ALLERGIES:  Allergies   Allergen Reactions    Simvastatin Other (See Comments)     Severe nose bleeds    Nitrofurantoin Other (See Comments)     Pt states medication causes profuse sweating, and fever.     Sulfa Antibiotics      PN: LW Reaction: Diaphoresis       FAMILY HISTORY:  No family history on file.    SOCIAL HISTORY:   Social History     Socioeconomic History    Marital status:    Tobacco Use    Smoking status: Never    Smokeless tobacco: Never   Substance and Sexual Activity    Alcohol use: Not Currently    Drug use: Never       PHYSICAL EXAM    VITAL SIGNS: BP (!) 162/74   Pulse 71   Temp 98.3  F (36.8  C) (Temporal)   Resp 20   Ht 1.702 m (5' 7\")   Wt 70.4 kg (155 lb 4.8 oz)   SpO2 96%   BMI 24.32 kg/m     GENERAL: Awake, alert, answering questions appropriately,   SPEECH:  Easy to understand speech, Normal volume and kemal  PULMONARY: No respiratory distress, Lungs clear to auscultation " bilaterally  CARDIOVASCULAR: Regular rate and rhythm, Distal pulses present and normal.  ABDOMINAL: Soft, Nondistended, Nontender, No rebound or guarding, No palpable masses  EXTREMITIES: No lower extremity edema.  PSYCH: Normal mood and affect     LAB:  All pertinent labs reviewed and interpreted.  Results for orders placed or performed during the hospital encounter of 03/01/24   XR Chest Port 1 View    Impression    IMPRESSION: Mild cardiomegaly. Pulmonary vessels normal. Lungs appear clear.   Basic metabolic panel   Result Value Ref Range    Sodium 140 135 - 145 mmol/L    Potassium 4.1 3.4 - 5.3 mmol/L    Chloride 101 98 - 107 mmol/L    Carbon Dioxide (CO2) 26 22 - 29 mmol/L    Anion Gap 13 7 - 15 mmol/L    Urea Nitrogen 16.3 8.0 - 23.0 mg/dL    Creatinine 0.98 (H) 0.51 - 0.95 mg/dL    GFR Estimate 54 (L) >60 mL/min/1.73m2    Calcium 9.1 8.2 - 9.6 mg/dL    Glucose 167 (H) 70 - 99 mg/dL   Result Value Ref Range    Troponin T, High Sensitivity 16 (H) <=14 ng/L   CBC with platelets and differential   Result Value Ref Range    WBC Count 7.3 4.0 - 11.0 10e3/uL    RBC Count 4.19 3.80 - 5.20 10e6/uL    Hemoglobin 13.7 11.7 - 15.7 g/dL    Hematocrit 42.3 35.0 - 47.0 %     (H) 78 - 100 fL    MCH 32.7 26.5 - 33.0 pg    MCHC 32.4 31.5 - 36.5 g/dL    RDW 13.2 10.0 - 15.0 %    Platelet Count 321 150 - 450 10e3/uL    % Neutrophils 80 %    % Lymphocytes 11 %    % Monocytes 8 %    % Eosinophils 0 %    % Basophils 1 %    % Immature Granulocytes 0 %    NRBCs per 100 WBC 0 <1 /100    Absolute Neutrophils 5.8 1.6 - 8.3 10e3/uL    Absolute Lymphocytes 0.8 0.8 - 5.3 10e3/uL    Absolute Monocytes 0.6 0.0 - 1.3 10e3/uL    Absolute Eosinophils 0.0 0.0 - 0.7 10e3/uL    Absolute Basophils 0.1 0.0 - 0.2 10e3/uL    Absolute Immature Granulocytes 0.0 <=0.4 10e3/uL    Absolute NRBCs 0.0 10e3/uL   Extra Blue Top Tube   Result Value Ref Range    Hold Specimen JIC    Extra Red Top Tube   Result Value Ref Range    Hold Specimen JIC     Troponin T, High Sensitivity (now)   Result Value Ref Range    Troponin T, High Sensitivity 14 <=14 ng/L       RADIOLOGY:  XR Chest Port 1 View   Final Result   IMPRESSION: Mild cardiomegaly. Pulmonary vessels normal. Lungs appear clear.            EKG:    Date and time: March 1, 2024 at 1556  Rate: 69 bpm  Rhythm: Sinus rhythm  ND interval: 176 ms  QRS interval: 84 ms  QT/QTc: 422/452 ms  ST changes or T wave changes: No acute ST or T wave abnormalities  Change from prior ECG: No significant change from prior  I have independently reviewed and interpreted this EKG.       I, Katlyn Clay, am serving as a scribe to document services personally performed by Dr. Pelayo based on my observation and the provider's statements to me. I, Fina Pelayo MD attest that Katlyn Clay is acting in a scribe capacity, has observed my performance of the services and has documented them in accordance with my direction.    Fina Pelayo M.D.  Emergency Medicine  [facility] EMERGENCY DEPARTMENT  [facility] [facility] [address]  [phone]  Dept: [phone]       Fina Pelayo MD  03/01/24 2039

## 2024-03-02 LAB
ATRIAL RATE - MUSE: 69 BPM
DIASTOLIC BLOOD PRESSURE - MUSE: NORMAL MMHG
INTERPRETATION ECG - MUSE: NORMAL
P AXIS - MUSE: 21 DEGREES
PR INTERVAL - MUSE: 176 MS
QRS DURATION - MUSE: 84 MS
QT - MUSE: 422 MS
QTC - MUSE: 452 MS
R AXIS - MUSE: 25 DEGREES
SYSTOLIC BLOOD PRESSURE - MUSE: NORMAL MMHG
T AXIS - MUSE: 51 DEGREES
VENTRICULAR RATE- MUSE: 69 BPM

## 2024-03-02 NOTE — DISCHARGE INSTRUCTIONS
You were seen in the Emergency Department today for evaluation of chest pain.  Your lab work showed no cause of your symptoms. Your imaging studies showed no significant cause of your symptoms.  Follow up with your primary care physician to ensure resolution of symptoms. Return if you have new or worsening symptoms.

## 2024-03-05 NOTE — PROGRESS NOTES
"  HEART CARE ENCOUNTER NOTE      [facility]  [phone]      Assessment/Recommendations   Assessment:   Hypertension: On amlodipine 2.5 mg daily, losartan 50 mg twice daily.  Blood pressure was elevated today at 156/68.  After sitting and resting at the end of the appointment was 122/60.  Patient notes that the batteries ran out of her blood pressure cuff but she will start monitoring at home.  Mild to moderate aortic insufficiency: Per echocardiogram June 2020.  Patient denies shortness of breath, lower extremity edema.  Sternal chest pain: Patient had presented to the ED 3/1/2024.  That night around 4 AM she had had sharp sternal chest pain as well as a cough.  Took an extra losartan and then went to bed.  She woke up around 10 AM and the pain was gone but went to the ED for evaluation and had negative workup with chest x-ray, troponin, EKG.  Patient denies any recurrence of this since.  Notes occasional 1 second \"twinges\" she will get but nothing long-lasting.  Suspect that it was more musculoskeletal as she developed it when she was twisting/rolling over in bed or possibly from her coughing.  Patient's CT scan in 2020 had shown mild coronary artery disease otherwise patient has no major cardiac history.      Plan:   Continue current medications  Consider monitoring blood pressure at home and let us know if greater than 140/90  If patient develops more recurrent episodes of the chest discomfort would further evaluate with stress test versus echocardiogram      Follow up in July 2024 with Dr. Feliciano       History of Present Illness/Subjective    HPI: Geneva Stearns is a 89 year old female with PMHx of mild to moderate aortic regurgitation, hypertension presents for ED follow-up.  Patient had last seen Noel Samayoa CNP 10/3/2023 where she had been doing well.  Was remaining active doing household chores without any exertional symptoms.    Patient had called 3/1/2024 with sharp sternal " "chest pain around 4 AM.  Patient had also been noting diarrhea, cough, malaise.  Patient went to the ED and troponin came back at 16 and repeat at 14.  Chest x-ray showed lungs were clear.  EKG showed no new ischemic changes.  Patient's pain had improved and had wanted to go home.  Was discharged same day.    Patient denies any recurrence of this since.  Notes the pain had developed when she was rolling over in bed.  Took an extra losartan and fell back asleep.  When she woke up at 10 AM the pain was gone but she called her daughter and went to the ED.  Patient notes an occasional 1 second twinge that she will get under her left breast but nothing long-lasting and nothing with exertion.  Is still able to do the same household chores without any exertional symptoms.  She denies fatigue, lightheadedness, shortness of breath, dyspnea on exertion, orthopnea, PND, palpitations, abdominal fullness/bloating, and lower extremity edema.        Echocardiogram 6/29/2020 results:    1.Left ventricle ejection fraction is normal. The calculated left ventricular ejection fraction is 67%.    2.TAPSE is normal, which is consistent with normal right ventricular systolic function.    3.Minimal bowing/prolapse of posterior mitral valve leaflet without any significant mitral regurgitation.    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    5.No previous study for comparison.     Physical Examination  Review of Systems   Vitals: BP (!) 156/68 (BP Location: Left arm, Patient Position: Sitting, Cuff Size: Adult Large)   Pulse 67   Resp 16   Ht 1.702 m (5' 7\")   Wt 69.4 kg (153 lb)   BMI 23.96 kg/m    BMI= Body mass index is 23.96 kg/m .  Wt Readings from Last 3 Encounters:   03/06/24 69.4 kg (153 lb)   03/01/24 70.4 kg (155 lb 4.8 oz)   10/03/23 68.9 kg (152 lb)           ENT/Mouth: membranes moist, no oral lesions or bleeding gums.      EYES:  no " scleral icterus, normal conjunctivae       Chest/Lungs:   lungs are clear to auscultation, no rales or wheezing,  equal chest wall expansion    Cardiovascular:   Regular. Normal first and second heart sounds with no murmurs, rubs, or gallops; the carotid, radial and posterior tibial pulses are intact,no edema bilaterally        Extremities: no cyanosis or clubbing   Skin: no xanthelasma, warm.    Neurologic: no tremors     Psychiatric: alert and oriented x3, calm        Please refer above for cardiac ROS details.        Medical History  Surgical History Family History Social History   No past medical history on file.  No past surgical history on file.  No family history on file.     Social History     Socioeconomic History    Marital status:      Spouse name: Not on file    Number of children: Not on file    Years of education: Not on file    Highest education level: Not on file   Occupational History    Not on file   Tobacco Use    Smoking status: Never    Smokeless tobacco: Never   Substance and Sexual Activity    Alcohol use: Not Currently    Drug use: Never    Sexual activity: Not on file   Other Topics Concern    Not on file   Social History Narrative    Not on file     Social Determinants of Health     Financial Resource Strain: Not on file   Food Insecurity: Not on file   Transportation Needs: Not on file   Physical Activity: Not on file   Stress: Not on file   Social Connections: Not on file   Interpersonal Safety: Not on file   Housing Stability: Not on file           Medications  Allergies   Current Outpatient Medications   Medication Sig Dispense Refill    amLODIPine (NORVASC) 2.5 MG tablet Take 1 tablet (2.5 mg) by mouth daily 90 tablet 3    CHONDROITIN SULFATE A ORAL [CHONDROITIN SULFATE A ORAL] Take 1 tablet by mouth daily. (Patient not taking: Reported on 10/3/2023)      cinnamon bark (CINNAMON) 500 mg capsule [CINNAMON BARK (CINNAMON) 500 MG CAPSULE] Take 500 mg by mouth daily. (Patient not  "taking: Reported on 10/3/2023)      ergocalciferol, vitamin D2, 10 mcg (400 unit) Tab [ERGOCALCIFEROL, VITAMIN D2, 10 MCG (400 UNIT) TAB] Take 1 tablet by mouth daily. (Patient not taking: Reported on 10/3/2023)      flaxseed oil 1,000 mg cap [FLAXSEED OIL 1,000 MG CAP] Take 1 tablet by mouth daily. (Patient not taking: Reported on 10/3/2023)      losartan (COZAAR) 50 MG tablet Take 1 tablet (50 mg) by mouth 2 times daily 180 tablet 3    Multiple Vitamins-Minerals (PRESERVISION AREDS 2 PO)       vitamin E 400 UNIT capsule [VITAMIN E 400 UNIT CAPSULE] Take 1 capsule by mouth daily. (Patient not taking: Reported on 10/3/2023)         Allergies   Allergen Reactions    Simvastatin Other (See Comments)     Severe nose bleeds    Nitrofurantoin Other (See Comments)     Pt states medication causes profuse sweating, and fever.     Sulfa Antibiotics      PN: LW Reaction: Diaphoresis          Lab Results    Chemistry/lipid CBC Cardiac Enzymes/BNP/TSH/INR   No results for input(s): \"CHOL\", \"HDL\", \"LDL\", \"TRIG\", \"CHOLHDLRATIO\" in the last 97647 hours.  No results for input(s): \"LDL\" in the last 22837 hours.  Recent Labs   Lab Test 03/01/24  1602      POTASSIUM 4.1   CHLORIDE 101   CO2 26   *   BUN 16.3   CR 0.98*   GFRESTIMATED 54*   TREY 9.1     Recent Labs   Lab Test 03/01/24  1602 10/03/23  1053   CR 0.98* 0.93     No results for input(s): \"A1C\" in the last 08729 hours.       Recent Labs   Lab Test 03/01/24  1602   WBC 7.3   HGB 13.7   HCT 42.3   *        Recent Labs   Lab Test 03/01/24  1602   HGB 13.7    No results for input(s): \"TROPONINI\" in the last 48824 hours.  No results for input(s): \"BNP\", \"NTBNPI\", \"NTBNP\" in the last 76750 hours.  No results for input(s): \"TSH\" in the last 66708 hours.  No results for input(s): \"INR\" in the last 93448 hours.       Mar Fitzgerald PA-C                                       "

## 2024-03-06 ENCOUNTER — OFFICE VISIT (OUTPATIENT)
Dept: CARDIOLOGY | Facility: CLINIC | Age: 89
End: 2024-03-06
Payer: MEDICARE

## 2024-03-06 VITALS
DIASTOLIC BLOOD PRESSURE: 60 MMHG | HEIGHT: 67 IN | RESPIRATION RATE: 16 BRPM | BODY MASS INDEX: 24.01 KG/M2 | WEIGHT: 153 LBS | HEART RATE: 67 BPM | SYSTOLIC BLOOD PRESSURE: 122 MMHG

## 2024-03-06 DIAGNOSIS — I35.1 AORTIC VALVE INSUFFICIENCY, ETIOLOGY OF CARDIAC VALVE DISEASE UNSPECIFIED: Primary | ICD-10-CM

## 2024-03-06 PROCEDURE — 99214 OFFICE O/P EST MOD 30 MIN: CPT | Performed by: STUDENT IN AN ORGANIZED HEALTH CARE EDUCATION/TRAINING PROGRAM

## 2024-03-06 NOTE — LETTER
"3/6/2024    Yee Nolasco MD  [address]  [place] [address]    RE: Geneva Stearns       Dear Colleague,     I had the pleasure of seeing Geneva Stearns in the [facility].    HEART CARE ENCOUNTER NOTE      [facility]  [phone]      Assessment/Recommendations   Assessment:   Hypertension: On amlodipine 2.5 mg daily, losartan 50 mg twice daily.  Blood pressure was elevated today at 156/68.  After sitting and resting at the end of the appointment was 122/60.  Patient notes that the batteries ran out of her blood pressure cuff but she will start monitoring at home.  Mild to moderate aortic insufficiency: Per echocardiogram June 2020.  Patient denies shortness of breath, lower extremity edema.  Sternal chest pain: Patient had presented to the ED 3/1/2024.  That night around 4 AM she had had sharp sternal chest pain as well as a cough.  Took an extra losartan and then went to bed.  She woke up around 10 AM and the pain was gone but went to the ED for evaluation and had negative workup with chest x-ray, troponin, EKG.  Patient denies any recurrence of this since.  Notes occasional 1 second \"twinges\" she will get but nothing long-lasting.  Suspect that it was more musculoskeletal as she developed it when she was twisting/rolling over in bed or possibly from her coughing.  Patient's CT scan in 2020 had shown mild coronary artery disease otherwise patient has no major cardiac history.      Plan:   Continue current medications  Consider monitoring blood pressure at home and let us know if greater than 140/90  If patient develops more recurrent episodes of the chest discomfort would further evaluate with stress test versus echocardiogram      Follow up in July 2024 with Dr. Feliciano       History of Present Illness/Subjective    HPI: Geneva Stearns is a 89 year old female with PMHx of mild to moderate aortic regurgitation, hypertension presents for ED follow-up.  " "Patient had last seen Noel Samayoa CNP 10/3/2023 where she had been doing well.  Was remaining active doing household chores without any exertional symptoms.    Patient had called 3/1/2024 with sharp sternal chest pain around 4 AM.  Patient had also been noting diarrhea, cough, malaise.  Patient went to the ED and troponin came back at 16 and repeat at 14.  Chest x-ray showed lungs were clear.  EKG showed no new ischemic changes.  Patient's pain had improved and had wanted to go home.  Was discharged same day.    Patient denies any recurrence of this since.  Notes the pain had developed when she was rolling over in bed.  Took an extra losartan and fell back asleep.  When she woke up at 10 AM the pain was gone but she called her daughter and went to the ED.  Patient notes an occasional 1 second twinge that she will get under her left breast but nothing long-lasting and nothing with exertion.  Is still able to do the same household chores without any exertional symptoms.  She denies fatigue, lightheadedness, shortness of breath, dyspnea on exertion, orthopnea, PND, palpitations, abdominal fullness/bloating, and lower extremity edema.        Echocardiogram 6/29/2020 results:    1.Left ventricle ejection fraction is normal. The calculated left ventricular ejection fraction is 67%.    2.TAPSE is normal, which is consistent with normal right ventricular systolic function.    3.Minimal bowing/prolapse of posterior mitral valve leaflet without any significant mitral regurgitation.    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    5.No previous study for comparison.     Physical Examination  Review of Systems   Vitals: BP (!) 156/68 (BP Location: Left arm, Patient Position: Sitting, Cuff Size: Adult Large)   Pulse 67   Resp 16   Ht 1.702 m (5' 7\")   Wt 69.4 kg (153 lb)   BMI 23.96 kg/m    BMI= Body mass index is 23.96 kg/m .  Wt " Readings from Last 3 Encounters:   03/06/24 69.4 kg (153 lb)   03/01/24 70.4 kg (155 lb 4.8 oz)   10/03/23 68.9 kg (152 lb)           ENT/Mouth: membranes moist, no oral lesions or bleeding gums.      EYES:  no scleral icterus, normal conjunctivae       Chest/Lungs:   lungs are clear to auscultation, no rales or wheezing,  equal chest wall expansion    Cardiovascular:   Regular. Normal first and second heart sounds with no murmurs, rubs, or gallops; the carotid, radial and posterior tibial pulses are intact,no edema bilaterally        Extremities: no cyanosis or clubbing   Skin: no xanthelasma, warm.    Neurologic: no tremors     Psychiatric: alert and oriented x3, calm        Please refer above for cardiac ROS details.        Medical History  Surgical History Family History Social History   No past medical history on file.  No past surgical history on file.  No family history on file.     Social History     Socioeconomic History    Marital status:      Spouse name: Not on file    Number of children: Not on file    Years of education: Not on file    Highest education level: Not on file   Occupational History    Not on file   Tobacco Use    Smoking status: Never    Smokeless tobacco: Never   Substance and Sexual Activity    Alcohol use: Not Currently    Drug use: Never    Sexual activity: Not on file   Other Topics Concern    Not on file   Social History Narrative    Not on file     Social Determinants of Health     Financial Resource Strain: Not on file   Food Insecurity: Not on file   Transportation Needs: Not on file   Physical Activity: Not on file   Stress: Not on file   Social Connections: Not on file   Interpersonal Safety: Not on file   Housing Stability: Not on file           Medications  Allergies   Current Outpatient Medications   Medication Sig Dispense Refill    amLODIPine (NORVASC) 2.5 MG tablet Take 1 tablet (2.5 mg) by mouth daily 90 tablet 3    CHONDROITIN SULFATE A ORAL [CHONDROITIN SULFATE A  "ORAL] Take 1 tablet by mouth daily. (Patient not taking: Reported on 10/3/2023)      cinnamon bark (CINNAMON) 500 mg capsule [CINNAMON BARK (CINNAMON) 500 MG CAPSULE] Take 500 mg by mouth daily. (Patient not taking: Reported on 10/3/2023)      ergocalciferol, vitamin D2, 10 mcg (400 unit) Tab [ERGOCALCIFEROL, VITAMIN D2, 10 MCG (400 UNIT) TAB] Take 1 tablet by mouth daily. (Patient not taking: Reported on 10/3/2023)      flaxseed oil 1,000 mg cap [FLAXSEED OIL 1,000 MG CAP] Take 1 tablet by mouth daily. (Patient not taking: Reported on 10/3/2023)      losartan (COZAAR) 50 MG tablet Take 1 tablet (50 mg) by mouth 2 times daily 180 tablet 3    Multiple Vitamins-Minerals (PRESERVISION AREDS 2 PO)       vitamin E 400 UNIT capsule [VITAMIN E 400 UNIT CAPSULE] Take 1 capsule by mouth daily. (Patient not taking: Reported on 10/3/2023)         Allergies   Allergen Reactions    Simvastatin Other (See Comments)     Severe nose bleeds    Nitrofurantoin Other (See Comments)     Pt states medication causes profuse sweating, and fever.     Sulfa Antibiotics      PN: LW Reaction: Diaphoresis          Lab Results    Chemistry/lipid CBC Cardiac Enzymes/BNP/TSH/INR   No results for input(s): \"CHOL\", \"HDL\", \"LDL\", \"TRIG\", \"CHOLHDLRATIO\" in the last 19634 hours.  No results for input(s): \"LDL\" in the last 46968 hours.  Recent Labs   Lab Test 03/01/24  1602      POTASSIUM 4.1   CHLORIDE 101   CO2 26   *   BUN 16.3   CR 0.98*   GFRESTIMATED 54*   TREY 9.1     Recent Labs   Lab Test 03/01/24  1602 10/03/23  1053   CR 0.98* 0.93     No results for input(s): \"A1C\" in the last 63964 hours.       Recent Labs   Lab Test 03/01/24  1602   WBC 7.3   HGB 13.7   HCT 42.3   *        Recent Labs   Lab Test 03/01/24  1602   HGB 13.7    No results for input(s): \"TROPONINI\" in the last 55947 hours.  No results for input(s): \"BNP\", \"NTBNPI\", \"NTBNP\" in the last 62939 hours.  No results for input(s): \"TSH\" in the last 02596 " "hours.  No results for input(s): \"INR\" in the last 42048 hours.       Mar Fitzgerald PA-C      Thank you for allowing me to participate in the care of your patient.      Sincerely,     Mar Fitzgerald PA-C     [facility]  cc:   No referring provider defined for this encounter.      "

## 2024-03-06 NOTE — PATIENT INSTRUCTIONS
Geneva Stearns,    It was a pleasure to see you today at the [facility].     My recommendations after this visit include:    - Continue current medications.   - Can stop monitoring blood pressure taking it 1-2 hours after eating and after medications, when resting for 15 minutes  - If you are having more episodes of chest pain that last longer than 1-2 seconds, let me know and we can do a further work up. Good range is 110-130s for the top number.   - Follow up with Dr. Feliciano in June/July when he is available    - Please call [phone], if you have any questions or concerns      Mar Fitzgerald PA-C

## 2024-07-10 ENCOUNTER — OFFICE VISIT (OUTPATIENT)
Dept: CARDIOLOGY | Facility: CLINIC | Age: 89
End: 2024-07-10
Payer: MEDICARE

## 2024-07-10 VITALS
SYSTOLIC BLOOD PRESSURE: 122 MMHG | DIASTOLIC BLOOD PRESSURE: 64 MMHG | HEART RATE: 60 BPM | RESPIRATION RATE: 14 BRPM | BODY MASS INDEX: 24.59 KG/M2 | WEIGHT: 157 LBS

## 2024-07-10 DIAGNOSIS — I35.1 AORTIC VALVE INSUFFICIENCY, ETIOLOGY OF CARDIAC VALVE DISEASE UNSPECIFIED: ICD-10-CM

## 2024-07-10 DIAGNOSIS — I10 PRIMARY HYPERTENSION: ICD-10-CM

## 2024-07-10 PROCEDURE — 99214 OFFICE O/P EST MOD 30 MIN: CPT | Performed by: INTERNAL MEDICINE

## 2024-07-10 PROCEDURE — G2211 COMPLEX E/M VISIT ADD ON: HCPCS | Performed by: INTERNAL MEDICINE

## 2024-07-10 NOTE — PROGRESS NOTES
Cardiology Progress Note     Assessment:  Hypertension, good control   Orthostatic lightheadedness, resolved  Sinus bradycardia, mild, resolved after discontinuation of atenolol  Aortic regurgitation, mild to moderate, asymptomatic, unchanged exam today  Nonexertional atypical chest pain, resolved    Plan:  Continue current antihypertensive medications  Would not recommend any further testing for atypical chest pain at this point    Follow-up in 1 year    Subjective:   This is 89 year old female who comes in today for follow-up visit.  She brought her blood pressure readings from home.  Her systolic blood pressure is 120s.  She does not complain of any lightheadedness.  She has not had syncope.  She had few episodes of sharp nonexertional chest pain.  She denies any chest pains with physical activity such as walking.  Her weight has been stable.  She has no PND and orthopnea    Review of Systems:   Negative other than history of present illness    Objective:   /64   Pulse 60   Resp 14   Wt 71.2 kg (157 lb)   BMI 24.59 kg/m    Physical Exam:  GENERAL: no distress  NECK: No JVD  LUNGS: Clear to auscultation.  CARDIAC: regular rhythm, S1 & S2 normal.  No heaves, thrills, gallops soft ejection murmur at the aortic area  ABDOMEN: flat, negative hepatosplenomegaly, soft and non-tender.  EXTREMITIES: No evidence of cyanosis, clubbing or edema.    Current Outpatient Medications   Medication Sig Dispense Refill    amLODIPine (NORVASC) 2.5 MG tablet Take 1 tablet (2.5 mg) by mouth daily 90 tablet 3    losartan (COZAAR) 50 MG tablet Take 1 tablet (50 mg) by mouth 2 times daily 180 tablet 3       Cardiographics:    ECG: 3/1/2024 read by me normal sinus rhythm with normal limits  Echocardiogram: June 2020    1.Left ventricle ejection fraction is normal. The calculated left ventricular ejection fraction is 67%.    2.TAPSE is normal, which is consistent with normal right ventricular systolic function.    3.Minimal  "bowing/prolapse of posterior mitral valve leaflet without any significant mitral regurgitation.    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    5.No previous study for comparison.       Lab Results    Chemistry/lipid CBC Cardiac Enzymes/BNP/TSH/INR   No results for input(s): \"CHOL\", \"HDL\", \"LDL\", \"TRIG\", \"CHOLHDLRATIO\" in the last 02116 hours.  No results for input(s): \"LDL\" in the last 29469 hours.  Recent Labs   Lab Test 03/01/24  1602      POTASSIUM 4.1   CHLORIDE 101   CO2 26   *   BUN 16.3   CR 0.98*   GFRESTIMATED 54*   TREY 9.1     Recent Labs   Lab Test 03/01/24  1602 10/03/23  1053   CR 0.98* 0.93     No results for input(s): \"A1C\" in the last 72619 hours.       Recent Labs   Lab Test 03/01/24  1602   WBC 7.3   HGB 13.7   HCT 42.3   *        Recent Labs   Lab Test 03/01/24  1602   HGB 13.7    No results for input(s): \"TROPONINI\" in the last 38027 hours.  No results for input(s): \"BNP\", \"NTBNPI\", \"NTBNP\" in the last 09824 hours.  No results for input(s): \"TSH\" in the last 05909 hours.  No results for input(s): \"INR\" in the last 58379 hours.                  "

## 2024-07-10 NOTE — LETTER
7/10/2024    Yee Nolasco MD  [address]  [place] [address]    RE: Geneva H Daryn       Dear Colleague,     I had the pleasure of seeing Geneva H Stearns in the [facility].      Cardiology Progress Note     Assessment:  Hypertension, good control   Orthostatic lightheadedness, resolved  Sinus bradycardia, mild, resolved after discontinuation of atenolol  Aortic regurgitation, mild to moderate, asymptomatic, unchanged exam today  Nonexertional atypical chest pain, resolved    Plan:  Continue current antihypertensive medications  Would not recommend any further testing for atypical chest pain at this point    Follow-up in 1 year    Subjective:   This is 89 year old female who comes in today for follow-up visit.  She brought her blood pressure readings from home.  Her systolic blood pressure is 120s.  She does not complain of any lightheadedness.  She has not had syncope.  She had few episodes of sharp nonexertional chest pain.  She denies any chest pains with physical activity such as walking.  Her weight has been stable.  She has no PND and orthopnea    Review of Systems:   Negative other than history of present illness    Objective:   /64   Pulse 60   Resp 14   Wt 71.2 kg (157 lb)   BMI 24.59 kg/m    Physical Exam:  GENERAL: no distress  NECK: No JVD  LUNGS: Clear to auscultation.  CARDIAC: regular rhythm, S1 & S2 normal.  No heaves, thrills, gallops soft ejection murmur at the aortic area  ABDOMEN: flat, negative hepatosplenomegaly, soft and non-tender.  EXTREMITIES: No evidence of cyanosis, clubbing or edema.    Current Outpatient Medications   Medication Sig Dispense Refill    amLODIPine (NORVASC) 2.5 MG tablet Take 1 tablet (2.5 mg) by mouth daily 90 tablet 3    losartan (COZAAR) 50 MG tablet Take 1 tablet (50 mg) by mouth 2 times daily 180 tablet 3       Cardiographics:    ECG: 3/1/2024 read by me normal sinus rhythm with normal limits  Echocardiogram: June 2020     "1.Left ventricle ejection fraction is normal. The calculated left ventricular ejection fraction is 67%.    2.TAPSE is normal, which is consistent with normal right ventricular systolic function.    3.Minimal bowing/prolapse of posterior mitral valve leaflet without any significant mitral regurgitation.    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    5.No previous study for comparison.       Lab Results    Chemistry/lipid CBC Cardiac Enzymes/BNP/TSH/INR   No results for input(s): \"CHOL\", \"HDL\", \"LDL\", \"TRIG\", \"CHOLHDLRATIO\" in the last 15600 hours.  No results for input(s): \"LDL\" in the last 82451 hours.  Recent Labs   Lab Test 03/01/24  1602      POTASSIUM 4.1   CHLORIDE 101   CO2 26   *   BUN 16.3   CR 0.98*   GFRESTIMATED 54*   TREY 9.1     Recent Labs   Lab Test 03/01/24  1602 10/03/23  1053   CR 0.98* 0.93     No results for input(s): \"A1C\" in the last 85460 hours.       Recent Labs   Lab Test 03/01/24  1602   WBC 7.3   HGB 13.7   HCT 42.3   *        Recent Labs   Lab Test 03/01/24  1602   HGB 13.7    No results for input(s): \"TROPONINI\" in the last 01480 hours.  No results for input(s): \"BNP\", \"NTBNPI\", \"NTBNP\" in the last 83121 hours.  No results for input(s): \"TSH\" in the last 32363 hours.  No results for input(s): \"INR\" in the last 76048 hours.                      Thank you for allowing me to participate in the care of your patient.      Sincerely,     Andrae Feliciano MD     [facility]  cc:   Ron Feliciano MD  [address]  [address]  [place] [address]      "

## 2024-10-18 DIAGNOSIS — I10 ESSENTIAL HYPERTENSION: Primary | ICD-10-CM

## 2024-10-19 DIAGNOSIS — I10 PRIMARY HYPERTENSION: ICD-10-CM

## 2024-10-21 RX ORDER — AMLODIPINE BESYLATE 2.5 MG/1
2.5 TABLET ORAL DAILY
Qty: 90 TABLET | Refills: 3 | Status: SHIPPED | OUTPATIENT
Start: 2024-10-21

## 2024-10-22 RX ORDER — LOSARTAN POTASSIUM 50 MG/1
50 TABLET ORAL 2 TIMES DAILY
Qty: 180 TABLET | Refills: 3 | Status: SHIPPED | OUTPATIENT
Start: 2024-10-22

## 2025-07-02 DIAGNOSIS — I10 ESSENTIAL HYPERTENSION: ICD-10-CM

## 2025-07-02 RX ORDER — AMLODIPINE BESYLATE 2.5 MG/1
2.5 TABLET ORAL DAILY
Qty: 90 TABLET | Refills: 0 | Status: SHIPPED | OUTPATIENT
Start: 2025-07-02

## 2025-07-09 ENCOUNTER — OFFICE VISIT (OUTPATIENT)
Dept: CARDIOLOGY | Facility: CLINIC | Age: OVER 89
End: 2025-07-09
Payer: MEDICARE

## 2025-07-09 VITALS
DIASTOLIC BLOOD PRESSURE: 80 MMHG | HEIGHT: 68 IN | WEIGHT: 147.2 LBS | RESPIRATION RATE: 14 BRPM | BODY MASS INDEX: 22.31 KG/M2 | SYSTOLIC BLOOD PRESSURE: 114 MMHG | HEART RATE: 66 BPM

## 2025-07-09 DIAGNOSIS — I35.1 AORTIC VALVE INSUFFICIENCY, ETIOLOGY OF CARDIAC VALVE DISEASE UNSPECIFIED: ICD-10-CM

## 2025-07-09 DIAGNOSIS — I10 PRIMARY HYPERTENSION: ICD-10-CM

## 2025-07-09 PROCEDURE — 3078F DIAST BP <80 MM HG: CPT | Performed by: INTERNAL MEDICINE

## 2025-07-09 PROCEDURE — 3074F SYST BP LT 130 MM HG: CPT | Performed by: INTERNAL MEDICINE

## 2025-07-09 PROCEDURE — 99214 OFFICE O/P EST MOD 30 MIN: CPT | Performed by: INTERNAL MEDICINE

## 2025-07-09 PROCEDURE — G2211 COMPLEX E/M VISIT ADD ON: HCPCS | Performed by: INTERNAL MEDICINE

## 2025-07-09 NOTE — LETTER
"7/9/2025    Yee Nolasco MD  [address]  [place] [address]    RE: Geneva Stearns       Dear Colleague,     I had the pleasure of seeing Geneva Stearns in the [facility].      Cardiology Progress Note     Assessment:  Hypertension, good control  Aortic regurgitation, clinically mild  Sinus bradycardia, mild, resolved after discontinuation of atenolol    Plan:  Continue current antihypertensive medications  Encouraged her to stay physically active    Follow-up in 1 year  The longitudinal plan of care for the diagnosis(es)/condition(s) as documented were addressed during this visit. Due to the added complexity in care, I will continue to support Geneva in the subsequent management and with ongoing continuity of care.   Subjective:   This is 89 year old female who comes in today for follow-up visit.  She has done well.  She stays physically active.  She lives independently in her own house.  She does chores around her house without any chest pain or shortness of breath.  She denies syncope or near syncope of    Review of Systems:   Negative other than history of present illness    Objective:   /80   Pulse 66   Resp 14   Ht 1.727 m (5' 8\")   Wt 66.8 kg (147 lb 3.2 oz)   BMI 22.38 kg/m    Physical Exam:  GENERAL: no distress  NECK: No JVD  LUNGS: Few dry crackles bilaterally  CARDIAC: regular rhythm, S1 & S2 normal.  No heaves, thrills, gallops or murmurs.  ABDOMEN: flat, negative hepatosplenomegaly, soft and non-tender.  EXTREMITIES: No evidence of cyanosis, clubbing or edema.    Current Outpatient Medications   Medication Sig Dispense Refill     amLODIPine (NORVASC) 2.5 MG tablet TAKE 1 TABLET(2.5 MG) BY MOUTH DAILY 90 tablet 0     losartan (COZAAR) 50 MG tablet TAKE 1 TABLET(50 MG) BY MOUTH TWICE DAILY 180 tablet 3       Cardiographics:    ECG: 3/1/2024 read by me normal sinus rhythm with normal limits  Echocardiogram: June 2020    1.Left ventricle ejection fraction is " "normal. The calculated left ventricular ejection fraction is 67%.    2.TAPSE is normal, which is consistent with normal right ventricular systolic function.    3.Minimal bowing/prolapse of posterior mitral valve leaflet without any significant mitral regurgitation.    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    5.No previous study for comparison     Lab Results    Chemistry/lipid CBC Cardiac Enzymes/BNP/TSH/INR   No results for input(s): \"CHOL\", \"HDL\", \"LDL\", \"TRIG\", \"CHOLHDLRATIO\" in the last 58276 hours.  No results for input(s): \"LDL\" in the last 02270 hours.  Recent Labs   Lab Test 03/01/24  1602      POTASSIUM 4.1   CHLORIDE 101   CO2 26   *   BUN 16.3   CR 0.98*   GFRESTIMATED 54*   TREY 9.1     Recent Labs   Lab Test 03/01/24  1602 10/03/23  1053   CR 0.98* 0.93     No results for input(s): \"A1C\" in the last 52940 hours.       Recent Labs   Lab Test 03/01/24  1602   WBC 7.3   HGB 13.7   HCT 42.3   *        Recent Labs   Lab Test 03/01/24  1602   HGB 13.7    No results for input(s): \"TROPONINI\" in the last 43345 hours.  No results for input(s): \"BNP\", \"NTBNPI\", \"NTBNP\" in the last 46588 hours.  No results for input(s): \"TSH\" in the last 01795 hours.  No results for input(s): \"INR\" in the last 29179 hours.                    Thank you for allowing me to participate in the care of your patient.      Sincerely,     Andrae Feliciano MD     [facility]  cc:   Ron Feliciano MD  [address]  [address]  [place] [address]      "

## 2025-07-09 NOTE — PROGRESS NOTES
"    Cardiology Progress Note     Assessment:  Hypertension, good control  Aortic regurgitation, clinically mild  Sinus bradycardia, mild, resolved after discontinuation of atenolol    Plan:  Continue current antihypertensive medications  Encouraged her to stay physically active    Follow-up in 1 year  The longitudinal plan of care for the diagnosis(es)/condition(s) as documented were addressed during this visit. Due to the added complexity in care, I will continue to support Geneva in the subsequent management and with ongoing continuity of care.   Subjective:   This is 89 year old female who comes in today for follow-up visit.  She has done well.  She stays physically active.  She lives independently in her own house.  She does chores around her house without any chest pain or shortness of breath.  She denies syncope or near syncope of    Review of Systems:   Negative other than history of present illness    Objective:   /80   Pulse 66   Resp 14   Ht 1.727 m (5' 8\")   Wt 66.8 kg (147 lb 3.2 oz)   BMI 22.38 kg/m    Physical Exam:  GENERAL: no distress  NECK: No JVD  LUNGS: Few dry crackles bilaterally  CARDIAC: regular rhythm, S1 & S2 normal.  No heaves, thrills, gallops or murmurs.  ABDOMEN: flat, negative hepatosplenomegaly, soft and non-tender.  EXTREMITIES: No evidence of cyanosis, clubbing or edema.    Current Outpatient Medications   Medication Sig Dispense Refill    amLODIPine (NORVASC) 2.5 MG tablet TAKE 1 TABLET(2.5 MG) BY MOUTH DAILY 90 tablet 0    losartan (COZAAR) 50 MG tablet TAKE 1 TABLET(50 MG) BY MOUTH TWICE DAILY 180 tablet 3       Cardiographics:    ECG: 3/1/2024 read by me normal sinus rhythm with normal limits  Echocardiogram: June 2020    1.Left ventricle ejection fraction is normal. The calculated left ventricular ejection fraction is 67%.    2.TAPSE is normal, which is consistent with normal right ventricular systolic function.    3.Minimal bowing/prolapse of posterior mitral " "valve leaflet without any significant mitral regurgitation.    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    5.No previous study for comparison     Lab Results    Chemistry/lipid CBC Cardiac Enzymes/BNP/TSH/INR   No results for input(s): \"CHOL\", \"HDL\", \"LDL\", \"TRIG\", \"CHOLHDLRATIO\" in the last 39923 hours.  No results for input(s): \"LDL\" in the last 50830 hours.  Recent Labs   Lab Test 03/01/24  1602      POTASSIUM 4.1   CHLORIDE 101   CO2 26   *   BUN 16.3   CR 0.98*   GFRESTIMATED 54*   TREY 9.1     Recent Labs   Lab Test 03/01/24  1602 10/03/23  1053   CR 0.98* 0.93     No results for input(s): \"A1C\" in the last 64104 hours.       Recent Labs   Lab Test 03/01/24  1602   WBC 7.3   HGB 13.7   HCT 42.3   *        Recent Labs   Lab Test 03/01/24  1602   HGB 13.7    No results for input(s): \"TROPONINI\" in the last 67121 hours.  No results for input(s): \"BNP\", \"NTBNPI\", \"NTBNP\" in the last 78972 hours.  No results for input(s): \"TSH\" in the last 37626 hours.  No results for input(s): \"INR\" in the last 43140 hours.                  "

## 2025-07-16 DIAGNOSIS — I35.1 AORTIC VALVE INSUFFICIENCY, ETIOLOGY OF CARDIAC VALVE DISEASE UNSPECIFIED: Primary | ICD-10-CM

## 2025-07-16 DIAGNOSIS — I10 PRIMARY HYPERTENSION: ICD-10-CM

## 2025-07-16 RX ORDER — LOSARTAN POTASSIUM 50 MG/1
50 TABLET ORAL 2 TIMES DAILY
Qty: 180 TABLET | Refills: 2 | Status: SHIPPED | OUTPATIENT
Start: 2025-07-16